# Patient Record
Sex: FEMALE | Race: WHITE | Employment: UNEMPLOYED | URBAN - NONMETROPOLITAN AREA
[De-identification: names, ages, dates, MRNs, and addresses within clinical notes are randomized per-mention and may not be internally consistent; named-entity substitution may affect disease eponyms.]

---

## 2018-05-16 ENCOUNTER — OFFICE VISIT (OUTPATIENT)
Dept: ORTHOPEDIC SURGERY | Age: 73
End: 2018-05-16

## 2018-05-16 VITALS — BODY MASS INDEX: 43.36 KG/M2 | WEIGHT: 244.71 LBS | HEIGHT: 63 IN

## 2018-05-16 DIAGNOSIS — M25.511 ACUTE PAIN OF RIGHT SHOULDER: Primary | ICD-10-CM

## 2018-05-16 PROCEDURE — 1036F TOBACCO NON-USER: CPT | Performed by: ORTHOPAEDIC SURGERY

## 2018-05-16 PROCEDURE — G8400 PT W/DXA NO RESULTS DOC: HCPCS | Performed by: ORTHOPAEDIC SURGERY

## 2018-05-16 PROCEDURE — 3017F COLORECTAL CA SCREEN DOC REV: CPT | Performed by: ORTHOPAEDIC SURGERY

## 2018-05-16 PROCEDURE — 1090F PRES/ABSN URINE INCON ASSESS: CPT | Performed by: ORTHOPAEDIC SURGERY

## 2018-05-16 PROCEDURE — 99214 OFFICE O/P EST MOD 30 MIN: CPT | Performed by: ORTHOPAEDIC SURGERY

## 2018-05-16 PROCEDURE — 1123F ACP DISCUSS/DSCN MKR DOCD: CPT | Performed by: ORTHOPAEDIC SURGERY

## 2018-05-16 PROCEDURE — 20610 DRAIN/INJ JOINT/BURSA W/O US: CPT | Performed by: ORTHOPAEDIC SURGERY

## 2018-05-16 PROCEDURE — G8427 DOCREV CUR MEDS BY ELIG CLIN: HCPCS | Performed by: ORTHOPAEDIC SURGERY

## 2018-05-16 PROCEDURE — 4040F PNEUMOC VAC/ADMIN/RCVD: CPT | Performed by: ORTHOPAEDIC SURGERY

## 2018-05-16 PROCEDURE — G8417 CALC BMI ABV UP PARAM F/U: HCPCS | Performed by: ORTHOPAEDIC SURGERY

## 2018-05-16 RX ORDER — MELOXICAM 15 MG/1
15 TABLET ORAL DAILY
Qty: 30 TABLET | Refills: 3 | Status: SHIPPED | OUTPATIENT
Start: 2018-05-16

## 2018-05-18 ENCOUNTER — HOSPITAL ENCOUNTER (OUTPATIENT)
Dept: PHYSICAL THERAPY | Age: 73
Discharge: HOME | End: 2018-05-31
Admitting: ORTHOPAEDIC SURGERY

## 2018-05-21 ENCOUNTER — HOSPITAL ENCOUNTER (OUTPATIENT)
Dept: PHYSICAL THERAPY | Age: 73
Discharge: HOME | End: 2018-05-22
Admitting: ORTHOPAEDIC SURGERY

## 2018-05-25 ENCOUNTER — HOSPITAL ENCOUNTER (OUTPATIENT)
Dept: PHYSICAL THERAPY | Age: 73
Discharge: HOME | End: 2018-05-26
Admitting: ORTHOPAEDIC SURGERY

## 2018-05-30 ENCOUNTER — HOSPITAL ENCOUNTER (OUTPATIENT)
Dept: PHYSICAL THERAPY | Age: 73
Discharge: HOME | End: 2018-06-30
Admitting: ORTHOPAEDIC SURGERY

## 2018-06-01 ENCOUNTER — HOSPITAL ENCOUNTER (OUTPATIENT)
Dept: PHYSICAL THERAPY | Age: 73
Discharge: HOME | End: 2018-06-02
Admitting: ORTHOPAEDIC SURGERY

## 2018-06-01 ENCOUNTER — HOSPITAL ENCOUNTER (OUTPATIENT)
Dept: OTHER | Age: 73
Discharge: HOME | End: 2018-06-01
Attending: ORTHOPAEDIC SURGERY | Admitting: ORTHOPAEDIC SURGERY

## 2018-06-06 ENCOUNTER — HOSPITAL ENCOUNTER (OUTPATIENT)
Dept: PHYSICAL THERAPY | Age: 73
Discharge: HOME | End: 2018-06-07
Admitting: ORTHOPAEDIC SURGERY

## 2018-06-13 ENCOUNTER — OFFICE VISIT (OUTPATIENT)
Dept: ORTHOPEDIC SURGERY | Age: 73
End: 2018-06-13

## 2018-06-13 ENCOUNTER — HOSPITAL ENCOUNTER (OUTPATIENT)
Dept: PHYSICAL THERAPY | Age: 73
Discharge: HOME | End: 2018-06-14
Admitting: ORTHOPAEDIC SURGERY

## 2018-06-13 VITALS — HEIGHT: 63 IN | WEIGHT: 244 LBS | BODY MASS INDEX: 43.23 KG/M2

## 2018-06-13 DIAGNOSIS — M25.511 ACUTE PAIN OF RIGHT SHOULDER: Primary | ICD-10-CM

## 2018-06-13 PROCEDURE — 4040F PNEUMOC VAC/ADMIN/RCVD: CPT | Performed by: ORTHOPAEDIC SURGERY

## 2018-06-13 PROCEDURE — 1036F TOBACCO NON-USER: CPT | Performed by: ORTHOPAEDIC SURGERY

## 2018-06-13 PROCEDURE — G8427 DOCREV CUR MEDS BY ELIG CLIN: HCPCS | Performed by: ORTHOPAEDIC SURGERY

## 2018-06-13 PROCEDURE — 1123F ACP DISCUSS/DSCN MKR DOCD: CPT | Performed by: ORTHOPAEDIC SURGERY

## 2018-06-13 PROCEDURE — G8400 PT W/DXA NO RESULTS DOC: HCPCS | Performed by: ORTHOPAEDIC SURGERY

## 2018-06-13 PROCEDURE — 3017F COLORECTAL CA SCREEN DOC REV: CPT | Performed by: ORTHOPAEDIC SURGERY

## 2018-06-13 PROCEDURE — G8417 CALC BMI ABV UP PARAM F/U: HCPCS | Performed by: ORTHOPAEDIC SURGERY

## 2018-06-13 PROCEDURE — 99214 OFFICE O/P EST MOD 30 MIN: CPT | Performed by: ORTHOPAEDIC SURGERY

## 2018-06-13 PROCEDURE — 1090F PRES/ABSN URINE INCON ASSESS: CPT | Performed by: ORTHOPAEDIC SURGERY

## 2018-06-18 ENCOUNTER — HOSPITAL ENCOUNTER (OUTPATIENT)
Dept: PHYSICAL THERAPY | Age: 73
Discharge: HOME | End: 2018-06-19
Admitting: ORTHOPAEDIC SURGERY

## 2018-06-20 ENCOUNTER — HOSPITAL ENCOUNTER (OUTPATIENT)
Dept: PHYSICAL THERAPY | Age: 73
Discharge: HOME | End: 2018-06-21
Admitting: ORTHOPAEDIC SURGERY

## 2018-06-27 ENCOUNTER — HOSPITAL ENCOUNTER (OUTPATIENT)
Dept: PHYSICAL THERAPY | Age: 73
Discharge: HOME | End: 2018-06-28
Admitting: ORTHOPAEDIC SURGERY

## 2018-07-01 ENCOUNTER — HOSPITAL ENCOUNTER (OUTPATIENT)
Dept: OTHER | Age: 73
Discharge: HOME | End: 2018-07-31
Attending: ORTHOPAEDIC SURGERY | Admitting: ORTHOPAEDIC SURGERY

## 2018-07-06 ENCOUNTER — HOSPITAL ENCOUNTER (OUTPATIENT)
Dept: PHYSICAL THERAPY | Age: 73
Discharge: HOME | End: 2018-07-07
Admitting: ORTHOPAEDIC SURGERY

## 2018-07-06 NOTE — FLOWSHEET NOTE
[address] [place], [place] [address]In[facility] Hospitalchristopher Mead  Phone: [phone] Fax: [phone]      Physical Therapy Daily Treatment Note  Date:      Patient Name:  Dimitri Cruz    :  1945  MRN: [id number]  Restrictions/Precautions:    Medical/Treatment Diagnosis Information:  · Diagnosis: Acute Pain of Right Shoulder M25.511  · Treatment Diagnosis: Right Shoulder Pain S66.666  Insurance/Certification information:  PT Insurance Information: [facility] [employer] Medicare  Physician Information:  Referring Practitioner: Sy Boston of care signed (Y/N):     Date of Patient follow up with Physician:     G-Code (if applicable):      Date G-Code Applied:    PT G-Codes  Functional Assessment Tool Used: Colbert Michael  Score: 27%  Functional Limitation: Carrying, moving and handling objects  Carrying, Moving and Handling Objects Current Status (): At least 20 percent but less than 40 percent impaired, limited or restricted  Carrying, Moving and Handling Objects Goal Status (): At least 1 percent but less than 20 percent impaired, limited or restricted    Progress Note: [x]  Yes  []  No  Next due by: Visit #10      Latex Allergy:  [x]NO      []YES  Preferred Language for Healthcare:   [x]English       []other:    Visit # Insurance Allowable   11 Medicare guidelines     Pain level:       SUBJECTIVE:  Patient states no shoulder pain for the past 4 days. 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 OBJECTIVE:   Observation:   Test measurements:      RESTRICTIONS/PRECAUTIONS: history of B RC repairs.     Exercises/Interventions:   Therapeutic Ex Sets/sec Reps Notes   UBE      Cane AAROM flex/press      3 way Isomet      T- band Row/pinch 2 15 rtb   T- band lower pinch 2 15 rtb   T- band ER activation With TC and Gtb    Supine SA punch 1 20 4#     SL ER/flexion/abd Prone Rows/HAB/ext 1# Rows/ext   Seat Table slides/Wall Slides Seated HH  Depression      No Money 2 15 hooklying with scap retraction   flex/scap 1# weights/ shoulders against the wall   Standing Punch      Lawnmower      pec minor door stretch R UE                     Manual Intervention      Shld /GH Mobs 16 min     Post Cap mobs      Thoracic/Rib manipualtion   Grade 2-3 mobs   CT MT/Mobs      PROM MT 5 min     Elbow mobs      STM/IASTM 8 in   Biceps/deltoid/Upper trap/pec minor   NMR re-education      T-spine Ext      Kinesotaping Scap/GH NMR      Body blade      Wall ball roll      Wall Ball bounce      Ball drops      Haylie Scap Bio          Therapeutic Exercise and NMR EXR  [x] (34685) Provided verbal/tactile cueing for activities related to strengthening, flexibility, endurance, ROM  for improvements in scapular, scapulothoracic and UE control with self care, reaching, carrying, lifting, house/yardwork, driving/computer work. [x] (98718) Provided verbal/tactile cueing for activities related to improving balance, coordination, kinesthetic sense, posture, motor skill, proprioception  to assist with  scapular, scapulothoracic and UE control with self care, reaching, carrying, lifting, house/yardwork, driving/computer work. Therapeutic Activities:    [] (72290 or 95479) Provided verbal/tactile cueing for activities related to improving balance, coordination, kinesthetic sense, posture, motor skill, proprioception and motor activation to allow for proper function of scapular, scapulothoracic and UE control with self care, carrying, lifting, driving/computer work.      Home Exercise Program:    [x] (86292) Reviewed/Progressed HEP activities related to strengthening, flexibility, endurance, ROM of scapular, scapulothoracic and UE control with self care, reaching, carrying, lifting, house/yardwork, driving/computer work  [] (39182) Reviewed/Progressed HEP activities related to improving balance, coordination, kinesthetic sense, posture, motor skill, proprioception of indicated by patients Functional Deficits. 4. Patient will return to prior functional activities without increased symptoms or restriction. 5. Patient will report no pain or limitations with hobbies and yard work. Progression Towards Functional goals:  [x] Patient is progressing as expected towards functional goals listed. [] Progression is slowed due to complexities listed. [] Progression has been slowed due to co-morbidities. [] Plan just implemented, too soon to assess goals progression  [] Other:     ASSESSMENT:  Patient tolerated treatment well today. Patient remained pain free throughout session and improved postural awareness noted at this time. F/U in 1 week followed by decreased frequency.        Return to Play: (if applicable)     []  Stage 1: Intro to Strength   []  Stage 2: Dynamic Strength and Intro to Plyometrics   []  Stage 3: Advanced Plyometrics and Intro to Throwing   []  Stage 4: Sport specific Training/Return to Sport     []  Ready to Return to Play, [employer]   []  Not Ready for Return to Sports   Comments:      Treatment/Activity Tolerance:  [x] Patient tolerated treatment well [] Patient limited by fatique  [] Patient limited by pain  [] Patient limited by other medical complications  [] Other:     Prognosis: [x] Good [] Fair  [] Poor    Patient Requires Follow-up: [x] Yes  [] No    PLAN:   [x] Continue per plan of care [] Alter current plan (see comments)  [] Plan of care initiated [] Hold pending MD visit [] Discharge    Electronically signed by: Dante Turcios PT

## 2018-07-16 ENCOUNTER — HOSPITAL ENCOUNTER (OUTPATIENT)
Dept: PHYSICAL THERAPY | Age: 73
Discharge: HOME | End: 2018-07-17
Admitting: ORTHOPAEDIC SURGERY

## 2018-07-16 NOTE — FLOWSHEET NOTE
[address] [place] [address]  Phone: [phone] Fax: [phone]      Physical Therapy Daily Treatment Note  Date:  2018    Patient Name:  Una Brown    :  1945  MRN: [id number]  Restrictions/Precautions:    Medical/Treatment Diagnosis Information:  · Diagnosis: Acute Pain of Right Shoulder M25.511  · Treatment Diagnosis: Right Shoulder Pain T69.755  Insurance/Certification information:  PT Insurance Information: [employer]  Physician Information:  Referring Practitioner: Kraig Núñez of care signed (Y/N):     Date of Patient follow up with Physician:     G-Code (if applicable):      Date G-Code Applied:    PT G-Codes  Functional Assessment Tool Used: Ethlyn Kayser  Score: 27%  Functional Limitation: Carrying, moving and handling objects  Carrying, Moving and Handling Objects Current Status (): At least 20 percent but less than 40 percent impaired, limited or restricted  Carrying, Moving and Handling Objects Goal Status (): At least 1 percent but less than 20 percent impaired, limited or restricted    Progress Note: [x]  Yes  []  No  Next due by: Visit #10      Latex Allergy:  [x]NO      []YES  Preferred Language for Healthcare:   [x]English       []other:    Visit # Insurance Allowable   13 Medicare guidelines     Pain level:       SUBJECTIVE:  Patient states the body is stiff all over. Patient reports the shoulder continues to be sore and the other is starting to hurt in some positions. OBJECTIVE:   Observation:   Test measurements:      RESTRICTIONS/PRECAUTIONS: history of B RC repairs.     Exercises/Interventions:   Therapeutic Ex Sets/sec Reps Notes   UBE      Cane AAROM flex/press      3 way Isomet      T- band Row/pinch 2 15 rtb   T- band lower pinch 2 15 rtb   T- band ER activation With TC and Gtb    Supine SA punch 1 20 4#     SL ER/flexion/abd 1 25ea #2 Prone Rows/HAB/ext 1# Rows/ext   Seat Table slides/Wall Slides      Seated HH  Depression      No Money 2 15 hooklying with scap retraction   flex/scap 1# weights/ shoulders against the wall   Standing Punch      Lawnmower      pec minor door stretch 4 20\" R UE                     Manual Intervention      Shld /GH Mobs 14 min     Post Cap mobs      Thoracic/Rib manipualtion      CT MT/Mobs      PROM MT 5 min     Elbow mobs      STM/IASTM 8 in   Biceps/deltoid/Upper trap/pec minor   NMR re-education      T-spine Ext      Kinesotaping Scap/GH NMR      Body blade      Wall ball roll      Wall Ball bounce      Ball drops      Haylie Scap Bio          Therapeutic Exercise and NMR EXR  [x] (55581) Provided verbal/tactile cueing for activities related to strengthening, flexibility, endurance, ROM  for improvements in scapular, scapulothoracic and UE control with self care, reaching, carrying, lifting, house/yardwork, driving/computer work. [x] (12996) Provided verbal/tactile cueing for activities related to improving balance, coordination, kinesthetic sense, posture, motor skill, proprioception  to assist with  scapular, scapulothoracic and UE control with self care, reaching, carrying, lifting, house/yardwork, driving/computer work. Therapeutic Activities:    [] (08809 or 83060) Provided verbal/tactile cueing for activities related to improving balance, coordination, kinesthetic sense, posture, motor skill, proprioception and motor activation to allow for proper function of scapular, scapulothoracic and UE control with self care, carrying, lifting, driving/computer work.      Home Exercise Program:    [x] (38290) Reviewed/Progressed HEP activities related to strengthening, flexibility, endurance, ROM of scapular, scapulothoracic and UE control with self care, reaching, carrying, lifting, house/yardwork, driving/computer work  [] (07887) Reviewed/Progressed HEP activities related to improving balance, coordination, kinesthetic sense, posture, motor skill, proprioception of scapular, scapulothoracic and UE control with self care, reaching, carrying, lifting, house/yardwork, driving/computer work      Manual Treatments:  PROM / STM / Oscillations-Mobs:  G-I, II, III, IV (Angeles, Inf., Post.)  [x] (53330) Provided manual therapy to mobilize soft tissue/joints of cervical/CT, scapular GHJ and UE for the purpose of modulating pain, promoting relaxation,  increasing ROM, reducing/eliminating soft tissue swelling/inflammation/restriction, improving soft tissue extensibility and allowing for proper ROM for normal function with self care, reaching, carrying, lifting, house/yardwork, driving/computer work    Modalities:   Charges:  Timed Code Treatment Minutes: 45   Total Treatment Minutes: 50     [] EVAL (LOW) 58558 (typically 20 minutes face-to-face)  [] EVAL (MOD) 41020 (typically 30 minutes face-to-face)  [] EVAL (HIGH) 84099 (typically 45 minutes face-to-face)  [] RE-EVAL     [x] HC(94775) x  1   [] IONTO  [] NMR (62813) x      [] VASO  [x] Manual (38269) x  2    [] Other:  [] TA x       [] Mech Traction (82325)  [] ES(attended) (30408)      [] ES (un) (73389):     GOALS:  Patient stated goal: To perform Adls, hobbies and yard work pain free. Therapist goals for Patient:   Short Term Goals: To be achieved in: 2 weeks  1. Independent in HEP and progression per patient tolerance, in order to prevent re-injury. 2. Patient will have a decrease in pain to facilitate improvement in movement, function, and ADLs as indicated by Functional Deficits. Long Term Goals: To be achieved in: 6weeks  1. Disability index score of 17% or less for the DASH to assist with reaching prior level of function. 2. Patient will demonstrate increased AROM to WNL to allow for proper joint functioning as indicated by patients Functional Deficits.    3. Patient will demonstrate an increase in Strength to good scapular and core control, 5/5 gross motor strength of the UE to allow for proper functional mobility as indicated by patients Functional Deficits. 4. Patient will return to prior functional activities without increased symptoms or restriction. 5. Patient will report no pain or limitations with hobbies and yard work. Progression Towards Functional goals:  [x] Patient is progressing as expected towards functional goals listed. [] Progression is slowed due to complexities listed. [] Progression has been slowed due to co-morbidities. [] Plan just implemented, too soon to assess goals progression  [] Other:     ASSESSMENT:  Patient tolerated treatment well today. Continued soreness in the shoulder and verbal and tactile cuing required for form and decreased compensations with ER. Increased pain this date with ER, decreased pain noted when cued to perform scap retractions with AROM.      Return to Play: (if applicable)     []  Stage 1: Intro to Strength   []  Stage 2: Dynamic Strength and Intro to Plyometrics   []  Stage 3: Advanced Plyometrics and Intro to Throwing   []  Stage 4: Sport specific Training/Return to Sport     []  Ready to Return to Play, [employer]   []  Not Ready for Return to Sports   Comments:      Treatment/Activity Tolerance:  [x] Patient tolerated treatment well [] Patient limited by fatique  [] Patient limited by pain  [] Patient limited by other medical complications  [] Other:     Prognosis: [x] Good [] Fair  [] Poor    Patient Requires Follow-up: [x] Yes  [] No    PLAN:   [x] Continue per plan of care [] Alter current plan (see comments)  [] Plan of care initiated [] Hold pending MD visit [] Discharge    Electronically signed by: Breanna Michelle PT

## 2018-07-25 ENCOUNTER — OFFICE VISIT (OUTPATIENT)
Dept: ORTHOPEDIC SURGERY | Age: 73
End: 2018-07-25

## 2018-07-25 ENCOUNTER — HOSPITAL ENCOUNTER (OUTPATIENT)
Dept: PHYSICAL THERAPY | Age: 73
Discharge: HOME | End: 2018-07-26
Admitting: ORTHOPAEDIC SURGERY

## 2018-07-25 VITALS — WEIGHT: 244 LBS | HEIGHT: 63 IN | BODY MASS INDEX: 43.23 KG/M2

## 2018-07-25 DIAGNOSIS — M25.511 ACUTE PAIN OF RIGHT SHOULDER: Primary | ICD-10-CM

## 2018-07-25 PROCEDURE — 1101F PT FALLS ASSESS-DOCD LE1/YR: CPT | Performed by: ORTHOPAEDIC SURGERY

## 2018-07-25 PROCEDURE — 4040F PNEUMOC VAC/ADMIN/RCVD: CPT | Performed by: ORTHOPAEDIC SURGERY

## 2018-07-25 PROCEDURE — 3017F COLORECTAL CA SCREEN DOC REV: CPT | Performed by: ORTHOPAEDIC SURGERY

## 2018-07-25 PROCEDURE — G8400 PT W/DXA NO RESULTS DOC: HCPCS | Performed by: ORTHOPAEDIC SURGERY

## 2018-07-25 PROCEDURE — 99214 OFFICE O/P EST MOD 30 MIN: CPT | Performed by: ORTHOPAEDIC SURGERY

## 2018-07-25 PROCEDURE — 1090F PRES/ABSN URINE INCON ASSESS: CPT | Performed by: ORTHOPAEDIC SURGERY

## 2018-07-25 PROCEDURE — G8427 DOCREV CUR MEDS BY ELIG CLIN: HCPCS | Performed by: ORTHOPAEDIC SURGERY

## 2018-07-25 PROCEDURE — G8417 CALC BMI ABV UP PARAM F/U: HCPCS | Performed by: ORTHOPAEDIC SURGERY

## 2018-07-25 PROCEDURE — 1036F TOBACCO NON-USER: CPT | Performed by: ORTHOPAEDIC SURGERY

## 2018-07-25 PROCEDURE — 1123F ACP DISCUSS/DSCN MKR DOCD: CPT | Performed by: ORTHOPAEDIC SURGERY

## 2018-07-25 NOTE — PROGRESS NOTES
shrug strength is 5 over 5 equal bilaterally. Radial ulnar and median nerve function is intact. Capillary refill is brisk. Elbow motion finger and wrist motion is full equal bilaterally. Deep tendon reflexes of the Brachial radialis, biceps, tricepsAre all +2/4 equal bilaterally. Cervical spine range of motion is full without pain negative Spurling's test.  Load-and-shift test is negative. Crank test is negative. Apprehension and relocation is negative. Anterior and posterior glide are equal bilaterally. Negative sulcus sign. No signs of any significant multidirectional instability. There is no scapular winging. There is no muscle atrophy of the latissimus dorsi, the deltoid, the periscapular musculature,The trapezius musculature or the pectoralis musculature. Negative Neer's test, negative Lewis test, no pain with crossarm elevation. Abduction --150 degrees  Flexion-- 180 degrees  Extension-- between 45-60 degrees  Latera/external  rotation --close to 90 degrees  Medial/ internal rotation -- between 70-90 degree      Reflex: upper extremity: Deep tendon reflexes of the biceps, triceps, brachioradialis +2/4 equal bilaterally  Lower extremity: +2/4 and equal bilaterally for patella and Achilles    Additional Comments:       Cervical spine exam demonstrates no  Radiculopathy no reproduction of the symptomology. Range of motion is normal without pain or radiculopathy and does not cause shoulder pain. Additional Examinations:  Left Upper Extremity: Examination of the left upper extremity does not show any tenderness, deformity or injury. Range of motion is unremarkable. There is no gross instability. There are no rashes, ulcerations or lesions. Strength and tone are normal.  Thoracic Spine: Examination of the thoracic spine does not show any tenderness, deformity or injury. Range of motion is unremarkable. There is no gross instability. There are no rashes, ulcerations or lesions.   Strength and tone are normal.  Neck: Examination of the neck does not show any tenderness, deformity or injury. Range of motion is unremarkable. There is no gross instability. There are no rashes, ulcerations or lesions. Strength and tone are normal.  Lower Back: Examination of the lower back does not show any tenderness, deformity or injury. Range of motion is unremarkable. There is no gross instability. There are no rashes, ulcerations or lesions. Strength and tone are normal.    Past Surgical History:   Procedure Laterality Date    KNEE ARTHROSCOPY      right    SHOULDER SURGERY      right    SHOULDER SURGERY  10-2-2012    left shoulder arthroscopy with subacromial decompression distal clavicle excision, rotator Cnss repair, labral debridement           Assessment:  Right shoulder acute injury completely resolved with physical therapy    Impression: Same    Office Procedures:  No orders of the defined types were placed in this encounter. Previous Treatments:  X-ray, physical therapy,    Treatment Plan:  Continue home exercise program follow-up as needed she is due to see Dr. Sonido Pinzon for future total knee arthroplasties      Lea Shaw. CORI Renee [address] [place] [address] and Sports Medicine  Sports Fellowship Trained  Board Certified  [employer] Team Physician      Disclaimer: \"This note was dictated with voice recognition software. Though review and correction are routine, we apologize for any errors. \"

## 2018-07-25 NOTE — FLOWSHEET NOTE
proprioception of scapular, scapulothoracic and UE control with self care, reaching, carrying, lifting, house/yardwork, driving/computer work      Manual Treatments:  PROM / STM / Oscillations-Mobs:  G-I, II, III, IV (Angeles, Inf., Post.)  [x] (55098) Provided manual therapy to mobilize soft tissue/joints of cervical/CT, scapular GHJ and UE for the purpose of modulating pain, promoting relaxation,  increasing ROM, reducing/eliminating soft tissue swelling/inflammation/restriction, improving soft tissue extensibility and allowing for proper ROM for normal function with self care, reaching, carrying, lifting, house/yardwork, driving/computer work    Modalities:   Charges:  Timed Code Treatment Minutes: 45   Total Treatment Minutes: 50     [] EVAL (LOW) 92039 (typically 20 minutes face-to-face)  [] EVAL (MOD) 96950 (typically 30 minutes face-to-face)  [] EVAL (HIGH) 26221 (typically 45 minutes face-to-face)  [] RE-EVAL     [x] AU(62691) x  2   [] IONTO  [] NMR (54183) x      [] VASO  [x] Manual (90146) x  1    [] Other:  [] TA x       [] Mech Traction (36859)  [] ES(attended) (89076)      [] ES (un) (36083):     GOALS:  Patient stated goal: To perform Adls, hobbies and yard work pain free. Therapist goals for Patient:   Short Term Goals: To be achieved in: 2 weeks  1. Independent in HEP and progression per patient tolerance, in order to prevent re-injury. 2. Patient will have a decrease in pain to facilitate improvement in movement, function, and ADLs as indicated by Functional Deficits. Long Term Goals: To be achieved in: 6weeks  1. Disability index score of 17% or less for the DASH to assist with reaching prior level of function. 2. Patient will demonstrate increased AROM to WNL to allow for proper joint functioning as indicated by patients Functional Deficits.    3. Patient will demonstrate an increase in Strength to good scapular and core control, 5/5 gross motor strength of the UE to allow for proper

## 2018-08-01 ENCOUNTER — HOSPITAL ENCOUNTER (OUTPATIENT)
Dept: OTHER | Age: 73
Discharge: HOME | End: 2018-08-31
Attending: ORTHOPAEDIC SURGERY | Admitting: ORTHOPAEDIC SURGERY

## 2018-08-14 ENCOUNTER — OFFICE VISIT (OUTPATIENT)
Dept: ORTHOPEDIC SURGERY | Age: 73
End: 2018-08-14

## 2018-08-14 VITALS — BODY MASS INDEX: 43.23 KG/M2 | WEIGHT: 244 LBS | HEIGHT: 63 IN

## 2018-08-14 DIAGNOSIS — M25.561 RIGHT KNEE PAIN, UNSPECIFIED CHRONICITY: Primary | ICD-10-CM

## 2018-08-14 DIAGNOSIS — M25.562 LEFT KNEE PAIN, UNSPECIFIED CHRONICITY: ICD-10-CM

## 2018-08-14 DIAGNOSIS — M17.0 PRIMARY OSTEOARTHRITIS OF BOTH KNEES: ICD-10-CM

## 2018-08-14 PROCEDURE — 99213 OFFICE O/P EST LOW 20 MIN: CPT | Performed by: ORTHOPAEDIC SURGERY

## 2018-08-14 PROCEDURE — 1090F PRES/ABSN URINE INCON ASSESS: CPT | Performed by: ORTHOPAEDIC SURGERY

## 2018-08-14 PROCEDURE — G8417 CALC BMI ABV UP PARAM F/U: HCPCS | Performed by: ORTHOPAEDIC SURGERY

## 2018-08-14 PROCEDURE — G8427 DOCREV CUR MEDS BY ELIG CLIN: HCPCS | Performed by: ORTHOPAEDIC SURGERY

## 2018-08-14 PROCEDURE — 1101F PT FALLS ASSESS-DOCD LE1/YR: CPT | Performed by: ORTHOPAEDIC SURGERY

## 2018-08-14 PROCEDURE — 3017F COLORECTAL CA SCREEN DOC REV: CPT | Performed by: ORTHOPAEDIC SURGERY

## 2018-08-14 NOTE — PROGRESS NOTES
Chief Complaint    Knee Pain (bilateral knees)      History of Present Illness:  Brittni Rider is a 67 y.o. female. She is here for consultation for her bilateral knees at referral Dr. Nick Leo. She does have a known history of long-term bilateral knee osteoarthritis. The right one has been worse than the left. She does have prior history of arthroscopy of the right knee she thinks back in 2008 at which time she had some meniscus removed and a chondroplasty. She has had several series of Synvisc injections as well as cortisone injections into this right knee since then. Left knee is also painful. She is never had arthroscopy on the left knee. She has never had Visco supplementation into the left knee. She has had cortisone injections into the left knee and the most recent one was in April of this year. She also does use meloxicam on a regular basis to help control pain. Feels like her activity is limited especially for long distance walking because of her knee pain           Medical History:  Patient's medications, allergies, past medical, surgical, social and family histories were reviewed and updated as appropriate. Review of Systems:  Pertinent items are noted in HPI  Review of systems reviewed from Patient History Form dated on 8/14/18 and available in the patient's chart under the Media tab. Vital Signs:  Ht 5' 3\" (1.6 m)   Wt 244 lb (110.7 kg)   BMI 43.22 kg/m²     General Exam:   Constitutional: Patient is adequately groomed with no evidence of malnutrition  DTRs: Deep tendon reflexes are intact  Mental Status: The patient is oriented to time, place and person. The patient's mood and affect are appropriate. Knee Examination:    Inspection:  She does have an overall varus alignment of bilateral knees. Palpation:  There is tenderness palpation along the medial joint line of the bilateral knees.   Palpable crepitus over the patellofemoral joint range of motion    Range of Motion:  Right

## 2018-08-15 ENCOUNTER — HOSPITAL ENCOUNTER (OUTPATIENT)
Dept: PHYSICAL THERAPY | Age: 73
Discharge: HOME | End: 2018-08-16
Admitting: ORTHOPAEDIC SURGERY

## 2018-08-15 NOTE — PLAN OF CARE
Assessment Tool Used: LEFS  Score: 54%  Functional Limitation: Mobility: Walking and moving around  Mobility: Walking and Moving Around Current Status (): At least 40 percent but less than 60 percent impaired, limited or restricted  Mobility: Walking and Moving Around Goal Status (): At least 20 percent but less than 40 percent impaired, limited or restricted    Pain Scale: 2-4/10  Easing factors: off loading LE  Provocative factors: walking, squatting, standing for long periods of time. Type: []Constant   []Intermittent  []Radiating [x]Localized []other:     Numbness/Tingling: numbness off an on on the lateral aspect of Right thigh    Occupation/School: Retired    Living Status/Prior Level of Function: Independent with ADLs and IADLs, and hobbies    OBJECTIVE:     ROM LEFT RIGHT   HIP Flex 100 100   HIP Abd     HIP Ext     HIP IR 25 25   HIP ER 23 20   Knee ext (5) (9)   Knee Flex 120 120   Ankle PF     Ankle DF     Ankle In     Ankle Ev     Strength  LEFT RIGHT   HIP Flexors 4/5 4/5   HIP Abductors 4-/5 4-/5   HIP Ext 4-/5 4-/5   Hip ER     Knee EXT (quad) 4/5 4/5   Knee Flex (HS) 4+/5 4+/5   Ankle DF     Ankle PF     Ankle Inv     Ankle EV          Circumference  Mid apex  7 cm prox             Reflexes/Sensation:    [x]Dermatomes/Myotomes intact    [x]Reflexes equal and normal bilaterally   []Other:    Joint mobility: Patella all directions (bilaterally), tib/fib, tib femoral bilaterally    []Normal    [x]Hypo   []Hyper    Palpation: Tight ITB, quad active trigger points     Functional Mobility/Transfers:  Min limitation     Posture: B genu varum, hip flexed posture    Bandages/Dressings/Incisions: NA    Gait: (include devices/WB status) Diminished heel strike, terminal knee extension and full hip extension     Orthopedic Special Tests: none                       [x] Patient history, allergies, meds reviewed. Medical chart reviewed. See intake form.      Review Of Systems (ROS):  [x]Performed Review of systems (Integumentary, CardioPulmonary, Neurological) by intake and observation. Intake form has been scanned into medical record. Patient has been instructed to contact their primary care physician regarding ROS issues if not already being addressed at this time. Co-morbidities/Complexities (which will affect course of rehabilitation):   []None           Arthritic conditions   []Rheumatoid arthritis (M05.9)  [x]Osteoarthritis (M19.91)   Cardiovascular conditions   []Hypertension (I10)  []Hyperlipidemia (E78.5)  []Angina pectoris (I20)  []Atherosclerosis (I70)  []CVA Musculoskeletal conditions   []Disc pathology   []Congenital spine pathologies   [x]Prior surgical intervention  []Osteoporosis (M81.8)  []Osteopenia (M85.8)   Endocrine conditions   []Hypothyroid (E03.9)  []Hyperthyroid Gastrointestinal conditions   []Constipation (U58.15)   Metabolic conditions   []Morbid obesity (E66.01)  [x]Diabetes type 1(E10.65) or 2 (E11.65)   []Neuropathy (G60.9)     Pulmonary conditions   []Asthma (J45)  []Coughing   []COPD (J44.9)   Psychological Disorders  []Anxiety (F41.9)  []Depression (F32.9)   []Other:   []Other:          Barriers to/and or personal factors that will affect rehab potential:              []Age  []Sex    []Smoker              []Motivation/Lack of Motivation                        []Co-Morbidities              []Cognitive Function, education/learning barriers              []Environmental, home barriers              []profession/work barriers  []past PT/medical experience  []other:  Justification:     Falls Risk Assessment (30 days):   [x] Falls Risk assessed and no intervention required.   [] Falls Risk assessed and Patient requires intervention due to being higher risk   TUG score (>12s at risk):     [] Falls education provided, including       G-Codes:  PT G-Codes  Functional Assessment Tool Used: LEFS  Score: 54%  Functional Limitation: Mobility: Walking and moving around  Mobility: Walking and activities. []Reduced participation in sport activities. Classification :    []Signs/symptoms consistent with post-surgical status including decreased ROM, strength and function. []Signs/symptoms consistent with joint sprain/strain   []Signs/symptoms consistent with patella-femoral syndrome   [x]Signs/symptoms consistent with knee OA/hip OA   []Signs/symptoms consistent with internal derangement of knee/Hip   []Signs/symptoms consistent with functional hip weakness/NMR control      []Signs/symptoms consistent with tendinitis/tendinosis    []signs/symptoms consistent with pathology which may benefit from Dry needling      []other:      Prognosis/Rehab Potential:      [x]Excellent   []Good    []Fair   []Poor    Tolerance of evaluation/treatment:    [x]Excellent   []Good    []Fair   []Poor    Physical Therapy Evaluation Complexity Justification  [x] A history of present problem with:  [] no personal factors and/or comorbidities that impact the plan of care;  [x]1-2 personal factors and/or comorbidities that impact the plan of care  []3 personal factors and/or comorbidities that impact the plan of care  [x] An examination of body systems using standardized tests and measures addressing any of the following: body structures and functions (impairments), activity limitations, and/or participation restrictions;:  [x] a total of 1-2 or more elements   [] a total of 3 or more elements   [] a total of 4 or more elements   [x] A clinical presentation with:  [x] stable and/or uncomplicated characteristics   [] evolving clinical presentation with changing characteristics  [] unstable and unpredictable characteristics;   [x] Clinical decision making of [x] low, [] moderate, [] high complexity using standardized patient assessment instrument and/or measurable assessment of functional outcome.     [x] EVAL (LOW) 64796 (typically 20 minutes face-to-face)  [] EVAL (MOD) 58398 (typically 30 minutes face-to-face)  [] EVAL (HIGH) 43017 (typically 45 minutes face-to-face)  [] RE-EVAL     PLAN:  Frequency/Duration:  2 days per week for 4 Weeks:  Interventions:  [x]  Therapeutic exercise including: strength training, ROM, for Lower extremity and core   [x]  NMR activation and proprioception for LE, Glutes and Core   [x]  Manual therapy as indicated for LE, Hip and spine to include: Dry Needling/IASTM, STM, PROM, Gr I-IV mobilizations, manipulation. [x] Modalities as needed that may include: thermal agents, E-stim, Biofeedback, US, iontophoresis as indicated  [x] Patient education on joint protection, postural re-education, activity modification, progression of HEP. HEP instruction: (see scanned forms)    GOALS:  Patient stated goal: To strengthen legs and decrease pain. Therapist goals for Patient:   Short Term Goals: To be achieved in: 2 weeks  1. Independent in HEP and progression per patient tolerance, in order to prevent re-injury. 2. Patient will have a decrease in pain to facilitate improvement in movement, function, and ADLs as indicated by Functional Deficits. Long Term Goals: To be achieved in: 4 weeks  1. Disability index score of 30% or less for the LEFS to assist with reaching prior level of function. 2. Patient will demonstrate increased AROM to WNL to allow for proper joint functioning as indicated by patients Functional Deficits. 3. Patient will demonstrate an increase in Strength to good proximal hip strength and control, within 5lb HHD in LE to allow for proper functional mobility as indicated by patients Functional Deficits. 4. Patient will return to prior functional activities without increased symptoms or restriction. 5. Patient will report walking for 30+ minutes without pain or feelings of weakness in B knees.     Electronically signed by:  Eva Quevedo PT

## 2018-08-15 NOTE — FLOWSHEET NOTE
[address] [place] [address]  Phone: [phone] Fax: [phone]    Physical Therapy Daily Treatment Note  Date:  8/15/2018    Patient Name:  Devota Moritz    :  1945  MRN: [id number]  Restrictions/Precautions:    Medical/Treatment Diagnosis Information:  Diagnosis: Primary OA of both knees M17.0  Treatment Diagnosis: Bilateral knee pain/weakness  Insurance/Certification information:  PT Insurance Information: Medicare/ [employer]  Physician Information:  Referring Practitioner: Ashley Judd  Plan of care signed (Y/N):     Date of Patient follow up with Physician:     G-Code (if applicable):      Date G-Code Applied:    PT G-Codes  Functional Assessment Tool Used: LEFS  Score: 54%  Functional Limitation: Mobility: Walking and moving around  Mobility: Walking and Moving Around Current Status (): At least 40 percent but less than 60 percent impaired, limited or restricted  Mobility: Walking and Moving Around Goal Status ():  At least 20 percent but less than 40 percent impaired, limited or restricted    Progress Note: [x]  Yes  []  No  Next due by: Visit #10       Latex Allergy:  [x]NO      []YES  Preferred Language for Healthcare:   [x]English       []other:    Visit # Insurance Allowable   1 medicare     Pain level:  2/10     SUBJECTIVE:  See eval    OBJECTIVE: See eval  Observation:   Test measurements:      RESTRICTIONS/PRECAUTIONS:     Exercises/Interventions:     Therapeutic Ex Sets/sec Reps Notes   Retro Stepper/BIKE      LAQ 1 10B    BFR      Sportcord March      3 way SLR 1 10B    SAQ 1 10B    Clam ABD      Hip Ext /table      BOSU fwd/side lunge      squat 1 12 At table/ UE  support   Leg Press Iso/Con/Ecc 0-      Cybex HS curl      TKE      Glute side walks      RDL      Slide Lunge      Slide HS eccentrics      Step ups/ecc step down      Swissball wall rolls- in SLS- hip drive      Quad hip ext/wall-ball hip and LE for self care, mobility, lifting, and ambulation/stair navigation      Manual Treatments:  PROM / STM / Oscillations-Mobs:  G-I, II, III, IV (PA's, Inf., Post.)  [x] (44779) Provided manual therapy to mobilize LE, proximal hip and/or LS spine soft tissue/joints for the purpose of modulating pain, promoting relaxation,  increasing ROM, reducing/eliminating soft tissue swelling/inflammation/restriction, improving soft tissue extensibility and allowing for proper ROM for normal function with self care, mobility, lifting and ambulation. Modalities:      Charges:  Timed Code Treatment Minutes: 35   Total Treatment Minutes: 60     [x] EVAL (LOW) 30875 (typically 20 minutes face-to-face)  [] EVAL (MOD) 44872 (typically 30 minutes face-to-face)  [] EVAL (HIGH) 31396 (typically 45 minutes face-to-face)  [] RE-EVAL     [x] RF(57249) x  1   [] IONTO  [] NMR (33587) x      [] VASO  [x] Manual (10073) x  1    [] Other:  [] TA x       [] Mech Traction (21533)  [] ES(attended) (76900)      [] ES (un) (80115):     GOALS:   Patient stated goal: To strengthen legs and decrease pain. Therapist goals for Patient:   Short Term Goals: To be achieved in: 2 weeks  1. Independent in HEP and progression per patient tolerance, in order to prevent re-injury. 2. Patient will have a decrease in pain to facilitate improvement in movement, function, and ADLs as indicated by Functional Deficits. Long Term Goals: To be achieved in: 4 weeks  1. Disability index score of 30% or less for the LEFS to assist with reaching prior level of function. 2. Patient will demonstrate increased AROM to WNL to allow for proper joint functioning as indicated by patients Functional Deficits. 3. Patient will demonstrate an increase in Strength to good proximal hip strength and control, within 5lb HHD in LE to allow for proper functional mobility as indicated by patients Functional Deficits.    4. Patient will return to prior functional activities without increased symptoms or restriction. 5. Patient will report walking for 30+ minutes without pain or feelings of weakness in B knees. Progression Towards Functional goals:  [] Patient is progressing as expected towards functional goals listed. [] Progression is slowed due to complexities listed. [] Progression has been slowed due to co-morbidities.   [x] Plan just implemented, too soon to assess goals progression  [] Other:     ASSESSMENT:  See eval    Return to Play: (if applicable)   []  Stage 1: Intro to Strength   []  Stage 2: Return to Run and Strength   []  Stage 3: Return to Jump and Strength   []  Stage 4: Dynamic Strength and Agility   []  Stage 5: Sport Specific Training     []  Ready to Return to Play, Meets All Above Stages   []  Not Ready for Return to Sports   Comments:                         Treatment/Activity Tolerance:  [x] Patient tolerated treatment well [] Patient limited by fatique  [] Patient limited by pain  [] Patient limited by other medical complications  [] Other:     Prognosis: [x] Good [] Fair  [] Poor    Patient Requires Follow-up: [x] Yes  [] No      PLAN: See belinda  [] Continue per plan of care [] Alter current plan (see comments)  [x] Plan of care initiated [] Hold pending MD visit [] Discharge    Electronically signed by: Breanna Michelle PT

## 2018-08-17 ENCOUNTER — HOSPITAL ENCOUNTER (OUTPATIENT)
Dept: PHYSICAL THERAPY | Age: 73
Discharge: HOME | End: 2018-08-18
Admitting: ORTHOPAEDIC SURGERY

## 2018-08-20 ENCOUNTER — HOSPITAL ENCOUNTER (OUTPATIENT)
Dept: PHYSICAL THERAPY | Age: 73
Discharge: HOME | End: 2018-08-21
Admitting: ORTHOPAEDIC SURGERY

## 2018-08-20 NOTE — FLOWSHEET NOTE
Slide HS eccentrics      Step ups/ecc step down 1 10B    Swissball wall rolls- in SLS- hip drive      Quad hip ext/wall-ball rolls                  Manual Intervention      Knee mobs/PROM 3 min     Tib/Fem Mobs      Patella Mobs 8 min     Ankle mobs      STM/IASTM 11 min  B quads         NMR re-education      Stateless/Biofeedback 10/10      BFR      G. Med activation/sidelying      G. Max Activation/prone      Hip Ext full ROM G. Activation      Bosu Bal and Prop- G Med      Single leg stance/Balance/Prop      Bosu Retro G. Med act      Prone Hip froggers- sliders/elevated                Therapeutic Exercise and NMR EXR  [x] (06848) Provided verbal/tactile cueing for activities related to strengthening, flexibility, endurance, ROM for improvements in LE, proximal hip, and core control with self care, mobility, lifting, ambulation. [x] (40728) Provided verbal/tactile cueing for activities related to improving balance, coordination, kinesthetic sense, posture, motor skill, proprioception  to assist with LE, proximal hip, and core control in self care, mobility, lifting, ambulation and eccentric single leg control.      NMR and Therapeutic Activities:    [] (51996 or 79774) Provided verbal/tactile cueing for activities related to improving balance, coordination, kinesthetic sense, posture, motor skill, proprioception and motor activation to allow for proper function of core, proximal hip and LE with self care and ADLs  [] (35071) Gait Re-education- Provided training and instruction to the patient for proper LE, core and proximal hip recruitment and positioning and eccentric body weight control with ambulation re-education including up and down stairs     Home Exercise Program:    [x] (71785) Reviewed/Progressed HEP activities related to strengthening, flexibility, endurance, ROM of core, proximal hip and LE for functional self-care, mobility, lifting and ambulation/stair navigation   [] (65007)Reviewed/Progressed HEP activities related to improving balance, coordination, kinesthetic sense, posture, motor skill, proprioception of core, proximal hip and LE for self care, mobility, lifting, and ambulation/stair navigation      Manual Treatments:  PROM / STM / Oscillations-Mobs:  G-I, II, III, IV (PA's, Inf., Post.)  [x] (01238) Provided manual therapy to mobilize LE, proximal hip and/or LS spine soft tissue/joints for the purpose of modulating pain, promoting relaxation,  increasing ROM, reducing/eliminating soft tissue swelling/inflammation/restriction, improving soft tissue extensibility and allowing for proper ROM for normal function with self care, mobility, lifting and ambulation. Modalities:      Charges:  Timed Code Treatment Minutes: 35   Total Treatment Minutes: 60     [] EVAL (LOW) 45744 (typically 20 minutes face-to-face)  [] EVAL (MOD) 16100 (typically 30 minutes face-to-face)  [] EVAL (HIGH) 57750 (typically 45 minutes face-to-face)  [] RE-EVAL     [x] KX(31591) x  1   [] IONTO  [] NMR (84778) x      [] VASO  [x] Manual (89317) x  1    [] Other:  [] TA x       [] Mech Traction (36820)  [] ES(attended) (38013)      [] ES (un) (53964):     GOALS:   Patient stated goal: To strengthen legs and decrease pain. Therapist goals for Patient:   Short Term Goals: To be achieved in: 2 weeks  1. Independent in HEP and progression per patient tolerance, in order to prevent re-injury. 2. Patient will have a decrease in pain to facilitate improvement in movement, function, and ADLs as indicated by Functional Deficits. Long Term Goals: To be achieved in: 4 weeks  1. Disability index score of 30% or less for the LEFS to assist with reaching prior level of function. 2. Patient will demonstrate increased AROM to WNL to allow for proper joint functioning as indicated by patients Functional Deficits.    3. Patient will demonstrate an increase in Strength to good proximal hip strength and control, within 5lb HHD in LE to allow for proper functional mobility as indicated by patients Functional Deficits. 4. Patient will return to prior functional activities without increased symptoms or restriction. 5. Patient will report walking for 30+ minutes without pain or feelings of weakness in B knees. Progression Towards Functional goals:  [x] Patient is progressing as expected towards functional goals listed. [] Progression is slowed due to complexities listed. [] Progression has been slowed due to co-morbidities. [] Plan just implemented, too soon to assess goals progression  [] Other:     ASSESSMENT:  Continued hypomobility noted throughout the throughout the patella in all directions. Patient tolerance for standing activities is showing improvements but remains limited.  Rest breaks encouraged due to patients fast onset of fatigue with SL isolated exercises I.e bosu lunge    Return to Play: (if applicable)   []  Stage 1: Intro to Strength   []  Stage 2: Return to Run and Strength   []  Stage 3: Return to Jump and Strength   []  Stage 4: Dynamic Strength and Agility   []  Stage 5: Sport Specific Training     []  Ready to Return to Play, Meets All Above Stages   []  Not Ready for Return to Sports   Comments:                         Treatment/Activity Tolerance:  [x] Patient tolerated treatment well [] Patient limited by fatique  [] Patient limited by pain  [] Patient limited by other medical complications  [] Other:     Prognosis: [x] Good [] Fair  [] Poor    Patient Requires Follow-up: [x] Yes  [] No      PLAN:  [x] Continue per plan of care [] Alter current plan (see comments)  [] Plan of care initiated [] Hold pending MD visit [] Discharge    Electronically signed by: Jimmie Brito PT

## 2018-08-20 NOTE — FLOWSHEET NOTE
[address] [place] [address]  Phone: [phone] Fax: [phone]    Physical Therapy Daily Treatment Note  Date:  2018    Patient Name:  Page Silverio    :  1945  MRN: [id number]  Restrictions/Precautions:    Medical/Treatment Diagnosis Information:  Diagnosis: Primary OA of both knees M17.0  Treatment Diagnosis: Bilateral knee pain/weakness  Insurance/Certification information:  PT Insurance Information: Medicare/ [employer]  Physician Information:  Referring Practitioner: Ronnie Salazar  Plan of care signed (Y/N):     Date of Patient follow up with Physician:     G-Code (if applicable):      Date G-Code Applied:    PT G-Codes  Functional Assessment Tool Used: LEFS  Score: 54%  Functional Limitation: Mobility: Walking and moving around  Mobility: Walking and Moving Around Current Status (): At least 40 percent but less than 60 percent impaired, limited or restricted  Mobility: Walking and Moving Around Goal Status (): At least 20 percent but less than 40 percent impaired, limited or restricted    Progress Note: []  Yes  [x]  No  Next due by: Visit #10       Latex Allergy:  [x]NO      []YES  Preferred Language for Healthcare:   [x]English       []other:    Visit # Insurance Allowable   2 medicare     Pain level:  2/10     SUBJECTIVE:  Patient reports increased soreness the knees from the evaluation and the HEP.      OBJECTIVE:   Observation:   Test measurements:      RESTRICTIONS/PRECAUTIONS:     Exercises/Interventions:     Therapeutic Ex Sets/sec Reps Notes   Retro Stepper/BIKE      LAQ 1 10B    BFR      Sportcord March      3 way SLR 1 10B    SAQ 1 10B    Clam ABD      Hip Ext /table 1 10    BOSU fwd/side lunge      squat 1 12 At table/ UE  support   Leg Press Iso/Con/Ecc 0-      Cybex HS curl      TKE 1 20B 30#   Glute side walks      Bridges 2 10    Slide Lunge 1 10    Slide HS eccentrics      Step ups/ecc step down      Swissball wall rolls- in SLS- hip drive      Quad hip ext/wall-ball rolls                  Manual Intervention      Knee mobs/PROM 6 min     Tib/Fem Mobs      Patella Mobs 8 min     Ankle mobs      STM/IASTM 11 min  B quads         NMR re-education      [place]/Biofeedback 10/10      BFR      G. Med activation/sidelying      G. Max Activation/prone      Hip Ext full ROM G. Activation      Bosu Bal and Prop- G Med      Single leg stance/Balance/Prop      Bosu Retro G. Med act      Prone Hip froggers- sliders/elevated                Therapeutic Exercise and NMR EXR  [x] (26642) Provided verbal/tactile cueing for activities related to strengthening, flexibility, endurance, ROM for improvements in LE, proximal hip, and core control with self care, mobility, lifting, ambulation. [x] (80308) Provided verbal/tactile cueing for activities related to improving balance, coordination, kinesthetic sense, posture, motor skill, proprioception  to assist with LE, proximal hip, and core control in self care, mobility, lifting, ambulation and eccentric single leg control.      NMR and Therapeutic Activities:    [] (50766 or 29197) Provided verbal/tactile cueing for activities related to improving balance, coordination, kinesthetic sense, posture, motor skill, proprioception and motor activation to allow for proper function of core, proximal hip and LE with self care and ADLs  [] (60209) Gait Re-education- Provided training and instruction to the patient for proper LE, core and proximal hip recruitment and positioning and eccentric body weight control with ambulation re-education including up and down stairs     Home Exercise Program:    [x] (52575) Reviewed/Progressed HEP activities related to strengthening, flexibility, endurance, ROM of core, proximal hip and LE for functional self-care, mobility, lifting and ambulation/stair navigation   [] (15082)Reviewed/Progressed HEP activities related to improving balance, coordination, kinesthetic sense, posture, motor skill, proprioception of core, proximal hip and LE for self care, mobility, lifting, and ambulation/stair navigation      Manual Treatments:  PROM / STM / Oscillations-Mobs:  G-I, II, III, IV (PA's, Inf., Post.)  [x] (43796) Provided manual therapy to mobilize LE, proximal hip and/or LS spine soft tissue/joints for the purpose of modulating pain, promoting relaxation,  increasing ROM, reducing/eliminating soft tissue swelling/inflammation/restriction, improving soft tissue extensibility and allowing for proper ROM for normal function with self care, mobility, lifting and ambulation. Modalities:      Charges:  Timed Code Treatment Minutes: 35   Total Treatment Minutes: 60     [] EVAL (LOW) 03413 (typically 20 minutes face-to-face)  [] EVAL (MOD) 03781 (typically 30 minutes face-to-face)  [] EVAL (HIGH) 62177 (typically 45 minutes face-to-face)  [] RE-EVAL     [x] MU(07857) x  1   [] IONTO  [] NMR (31674) x      [] VASO  [x] Manual (62775) x  1    [] Other:  [] TA x       [] Mech Traction (18143)  [] ES(attended) (43507)      [] ES (un) (82219):     GOALS:   Patient stated goal: To strengthen legs and decrease pain. Therapist goals for Patient:   Short Term Goals: To be achieved in: 2 weeks  1. Independent in HEP and progression per patient tolerance, in order to prevent re-injury. 2. Patient will have a decrease in pain to facilitate improvement in movement, function, and ADLs as indicated by Functional Deficits. Long Term Goals: To be achieved in: 4 weeks  1. Disability index score of 30% or less for the LEFS to assist with reaching prior level of function. 2. Patient will demonstrate increased AROM to WNL to allow for proper joint functioning as indicated by patients Functional Deficits.    3. Patient will demonstrate an increase in Strength to good proximal hip strength and control, within 5lb HHD in LE to allow for proper functional mobility as indicated by patients Functional Deficits. 4. Patient will return to prior functional activities without increased symptoms or restriction. 5. Patient will report walking for 30+ minutes without pain or feelings of weakness in B knees. Progression Towards Functional goals:  [x] Patient is progressing as expected towards functional goals listed. [] Progression is slowed due to complexities listed. [] Progression has been slowed due to co-morbidities. [] Plan just implemented, too soon to assess goals progression  [] Other:     ASSESSMENT:  Continued hypomobility noted throughout the throughout the patella in all directions. Patient tolerance for standing activities is showing improvements but remains limited.      Return to Play: (if applicable)   []  Stage 1: Intro to Strength   []  Stage 2: Return to Run and Strength   []  Stage 3: Return to Jump and Strength   []  Stage 4: Dynamic Strength and Agility   []  Stage 5: Sport Specific Training     []  Ready to Return to Play, Meets All Above Stages   []  Not Ready for Return to Sports   Comments:                         Treatment/Activity Tolerance:  [x] Patient tolerated treatment well [] Patient limited by fatique  [] Patient limited by pain  [] Patient limited by other medical complications  [] Other:     Prognosis: [x] Good [] Fair  [] Poor    Patient Requires Follow-up: [x] Yes  [] No      PLAN:  [] Continue per plan of care [] Alter current plan (see comments)  [x] Plan of care initiated [] Hold pending MD visit [] Discharge    Electronically signed by: Zoila Jones PT

## 2018-08-23 ENCOUNTER — HOSPITAL ENCOUNTER (OUTPATIENT)
Dept: PHYSICAL THERAPY | Age: 73
Discharge: HOME | End: 2018-08-24
Admitting: ORTHOPAEDIC SURGERY

## 2018-08-23 NOTE — FLOWSHEET NOTE
related to improving balance, coordination, kinesthetic sense, posture, motor skill, proprioception of core, proximal hip and LE for self care, mobility, lifting, and ambulation/stair navigation      Manual Treatments:  PROM / STM / Oscillations-Mobs:  G-I, II, III, IV (PA's, Inf., Post.)  [x] (74918) Provided manual therapy to mobilize LE, proximal hip and/or LS spine soft tissue/joints for the purpose of modulating pain, promoting relaxation,  increasing ROM, reducing/eliminating soft tissue swelling/inflammation/restriction, improving soft tissue extensibility and allowing for proper ROM for normal function with self care, mobility, lifting and ambulation. Modalities:      Charges:  Timed Code Treatment Minutes: 32   Total Treatment Minutes: 45     [] EVAL (LOW) 17498 (typically 20 minutes face-to-face)  [] EVAL (MOD) 65129 (typically 30 minutes face-to-face)  [] EVAL (HIGH) 27453 (typically 45 minutes face-to-face)  [] RE-EVAL     [x] FN(73043) x  1   [] IONTO  [] NMR (40736) x      [] VASO  [x] Manual (06905) x  1    [] Other:  [] TA x       [] Mech Traction (57463)  [] ES(attended) (75870)      [] ES (un) (04133):     GOALS:   Patient stated goal: To strengthen legs and decrease pain. Therapist goals for Patient:   Short Term Goals: To be achieved in: 2 weeks  1. Independent in HEP and progression per patient tolerance, in order to prevent re-injury. 2. Patient will have a decrease in pain to facilitate improvement in movement, function, and ADLs as indicated by Functional Deficits. Long Term Goals: To be achieved in: 4 weeks  1. Disability index score of 30% or less for the LEFS to assist with reaching prior level of function. 2. Patient will demonstrate increased AROM to WNL to allow for proper joint functioning as indicated by patients Functional Deficits.    3. Patient will demonstrate an increase in Strength to good proximal hip strength and control, within 5lb HHD in LE to allow for proper

## 2018-09-01 ENCOUNTER — HOSPITAL ENCOUNTER (OUTPATIENT)
Dept: OTHER | Age: 73
Discharge: HOME | End: 2018-09-01
Attending: ORTHOPAEDIC SURGERY | Admitting: ORTHOPAEDIC SURGERY

## 2018-09-07 ENCOUNTER — HOSPITAL ENCOUNTER (OUTPATIENT)
Dept: PHYSICAL THERAPY | Age: 73
Discharge: HOME | End: 2018-09-08
Admitting: ORTHOPAEDIC SURGERY

## 2018-09-07 NOTE — FLOWSHEET NOTE
[address] [place] [address] [place] [address]  Phone: [phone] Fax: [phone]    Physical Therapy Daily Treatment Note  Date:  2018    Patient Name:  Linda Ash    :  1945  MRN: [id number]  Restrictions/Precautions:    Medical/Treatment Diagnosis Information:  Diagnosis: Primary OA of both knees M17.0  Treatment Diagnosis: Bilateral knee pain/weakness  Insurance/Certification information:  PT Insurance Information: Medicare/ Hum[employer]  Physician Information:  Referring Practitioner: Ximena Cline  Plan of care signed (Y/N):     Date of Patient follow up with Physician:     G-Code (if applicable):      Date G-Code Applied:    PT G-Codes  Functional Assessment Tool Used: LEFS  Score: 54%  Functional Limitation: Mobility: Walking and moving around  Mobility: Walking and Moving Around Current Status (): At least 40 percent but less than 60 percent impaired, limited or restricted  Mobility: Walking and Moving Around Goal Status (): At least 20 percent but less than 40 percent impaired, limited or restricted    Progress Note: []  Yes  [x]  No  Next due by: Visit #10       Latex Allergy:  [x]NO      []YES  Preferred Language for Healthcare:   [x]English       []other:    Visit # Insurance Allowable   6 medicare     Pain level:  2/10     SUBJECTIVE:  Patient reports the knees yesterday had no pain. Today there is slight soreness in the right knee.      OBJECTIVE:   Observation:   Test measurements:      RESTRICTIONS/PRECAUTIONS:     Exercises/Interventions:     Therapeutic Ex  Sets/sec Reps Notes   Retro Stepper/BIKE 6 min  Onset of session   LAQ 1 15B #2   BFR      Sportcord March      3 way SLR    SAQ    Clam ABD      Hip Ext [place]      BOSU fwd/side lunge    squat 1 10B UE support   Leg Press Iso/Con/Ecc 0-      Cybex HS curl      TKE Glute side walks Beals above knees   Bridges      Slide Lunge      Slide HS eccentrics ambulation/stair navigation   [] (27465)Reviewed/Progressed HEP activities related to improving balance, coordination, kinesthetic sense, posture, motor skill, proprioception of core, proximal hip and LE for self care, mobility, lifting, and ambulation/stair navigation      Manual Treatments:  PROM / STM / Oscillations-Mobs:  G-I, II, III, IV (PA's, Inf., Post.)  [x] (25158) Provided manual therapy to mobilize LE, proximal hip and/or LS spine soft tissue/joints for the purpose of modulating pain, promoting relaxation,  increasing ROM, reducing/eliminating soft tissue swelling/inflammation/restriction, improving soft tissue extensibility and allowing for proper ROM for normal function with self care, mobility, lifting and ambulation. Modalities:      Charges:  Timed Code Treatment Minutes: 45   Total Treatment Minutes: 60     [] EVAL (LOW) 34904 (typically 20 minutes face-to-face)  [] EVAL (MOD) 74618 (typically 30 minutes face-to-face)  [] EVAL (HIGH) 68617 (typically 45 minutes face-to-face)  [] RE-EVAL     [x] RQ(50118) x  1   [] IONTO  [] NMR (24905) x      [] VASO  [x] Manual (09269) x  2    [] Other:  [] TA x       [] Mech Traction (60205)  [] ES(attended) (84086)      [] ES (un) (28022):     GOALS:   Patient stated goal: To strengthen legs and decrease pain. Therapist goals for Patient:   Short Term Goals: To be achieved in: 2 weeks  1. Independent in HEP and progression per patient tolerance, in order to prevent re-injury. 2. Patient will have a decrease in pain to facilitate improvement in movement, function, and ADLs as indicated by Functional Deficits. Long Term Goals: To be achieved in: 4 weeks  1. Disability index score of 30% or less for the LEFS to assist with reaching prior level of function. 2. Patient will demonstrate increased AROM to WNL to allow for proper joint functioning as indicated by patients Functional Deficits.    3. Patient will demonstrate an increase in Strength to good proximal hip strength and control, within 5lb HHD in LE to allow for proper functional mobility as indicated by patients Functional Deficits. 4. Patient will return to prior functional activities without increased symptoms or restriction. 5. Patient will report walking for 30+ minutes without pain or feelings of weakness in B knees. Progression Towards Functional goals:  [x] Patient is progressing as expected towards functional goals listed. [] Progression is slowed due to complexities listed. [] Progression has been slowed due to co-morbidities. [] Plan just implemented, too soon to assess goals progression  [] Other:     ASSESSMENT:  Steady progress in exercise tolerance at this time. Poor eccentric control of the quadriceps with stairs at this time. Improved activation when knee flexion sequencing with descending stairs was provided.        Return to Play: (if applicable)   []  Stage 1: Intro to Strength   []  Stage 2: Return to Run and Strength   []  Stage 3: Return to Jump and Strength   []  Stage 4: Dynamic Strength and Agility   []  Stage 5: Sport Specific Training     []  Ready to Return to Play, Meets All Above Stages   []  Not Ready for Return to Sports   Comments:                         Treatment/Activity Tolerance:  [x] Patient tolerated treatment well [] Patient limited by fatique  [] Patient limited by pain  [] Patient limited by other medical complications  [] Other:     Prognosis: [x] Good [] Fair  [] Poor    Patient Requires Follow-up: [x] Yes  [] No      PLAN:  [x] Continue per plan of care [] Alter current plan (see comments)  [] Plan of care initiated [] Hold pending MD visit [] Discharge    Electronically signed by: Lisa Abad PT

## 2018-09-11 ENCOUNTER — OFFICE VISIT (OUTPATIENT)
Dept: ORTHOPEDIC SURGERY | Age: 73
End: 2018-09-11

## 2018-09-11 VITALS — BODY MASS INDEX: 43.23 KG/M2 | WEIGHT: 244 LBS | HEIGHT: 63 IN

## 2018-09-11 DIAGNOSIS — M17.0 PRIMARY OSTEOARTHRITIS OF BOTH KNEES: Primary | ICD-10-CM

## 2018-09-11 PROCEDURE — 1036F TOBACCO NON-USER: CPT | Performed by: ORTHOPAEDIC SURGERY

## 2018-09-11 PROCEDURE — 1090F PRES/ABSN URINE INCON ASSESS: CPT | Performed by: ORTHOPAEDIC SURGERY

## 2018-09-11 PROCEDURE — G8400 PT W/DXA NO RESULTS DOC: HCPCS | Performed by: ORTHOPAEDIC SURGERY

## 2018-09-11 PROCEDURE — 3017F COLORECTAL CA SCREEN DOC REV: CPT | Performed by: ORTHOPAEDIC SURGERY

## 2018-09-11 PROCEDURE — 99213 OFFICE O/P EST LOW 20 MIN: CPT | Performed by: ORTHOPAEDIC SURGERY

## 2018-09-11 PROCEDURE — 1123F ACP DISCUSS/DSCN MKR DOCD: CPT | Performed by: ORTHOPAEDIC SURGERY

## 2018-09-11 PROCEDURE — G8417 CALC BMI ABV UP PARAM F/U: HCPCS | Performed by: ORTHOPAEDIC SURGERY

## 2018-09-11 PROCEDURE — G8427 DOCREV CUR MEDS BY ELIG CLIN: HCPCS | Performed by: ORTHOPAEDIC SURGERY

## 2018-09-11 PROCEDURE — 1101F PT FALLS ASSESS-DOCD LE1/YR: CPT | Performed by: ORTHOPAEDIC SURGERY

## 2018-09-11 PROCEDURE — 4040F PNEUMOC VAC/ADMIN/RCVD: CPT | Performed by: ORTHOPAEDIC SURGERY

## 2018-09-11 RX ORDER — MELOXICAM 15 MG/1
15 TABLET ORAL DAILY
Qty: 30 TABLET | Refills: 3 | Status: SHIPPED | OUTPATIENT
Start: 2018-09-11 | End: 2019-02-12 | Stop reason: SDUPTHER

## 2018-09-11 NOTE — PROGRESS NOTES
Chief Complaint    Follow-up (bilat knees)      History of Present Illness:  Alexa Watkins is a 67 y.o. female. She is here for follow-up for her bilateral knees. Overall states she's had improvement with physical therapy. Feels like she is stronger both of her knees. Feels that she is able to do a little bit more activity. She still is limited especially with walking. She states she has a lot of pain initially when she does 1st and initiate movement. She is interested in total knee arthroplasty. She has done viscose supplementation injections in the past.  She does use meloxicam on a regular basis. Medical History:  Patient's medications, allergies, past medical, surgical, social and family histories were reviewed and updated as appropriate. Review of Systems:  Pertinent items are noted in HPI  Review of systems reviewed from Patient History Form dated on 9/11/18 and available in the patient's chart under the Media tab. Vital Signs:  Ht 5' 3\" (1.6 m)   Wt 244 lb (110.7 kg)   BMI 43.22 kg/m²     General Exam:   Constitutional: Patient is adequately groomed with no evidence of malnutrition  DTRs: Deep tendon reflexes are intact  Mental Status: The patient is oriented to time, place and person. The patient's mood and affect are appropriate. Knee Examination:    Inspection:  She does have an overall varus alignment of bilateral knees.     Palpation:  There is tenderness palpation along the medial joint line of the bilateral knees. Palpable crepitus over the patellofemoral joint range of motion     Range of Motion:  Right knee is 7-10° short of full extension, left knee is 5° short of full extension. Both knees flex to 110°     Strength: To do straight leg raise     Special Tests:  She does have some pseudo-valgus laxity bilaterally.   Negative posterior drawer.     Skin: There are no rashes, ulcerations or lesions.     Gait: Walking with an antalgic gait    Additional Comments:

## 2019-02-12 DIAGNOSIS — M17.0 PRIMARY OSTEOARTHRITIS OF BOTH KNEES: Primary | ICD-10-CM

## 2019-02-12 RX ORDER — MELOXICAM 15 MG/1
TABLET ORAL
Qty: 30 TABLET | Refills: 3 | Status: SHIPPED | OUTPATIENT
Start: 2019-02-12 | End: 2019-11-14

## 2019-11-14 ENCOUNTER — APPOINTMENT (OUTPATIENT)
Dept: CT IMAGING | Age: 74
End: 2019-11-14
Payer: MEDICARE

## 2019-11-14 ENCOUNTER — HOSPITAL ENCOUNTER (EMERGENCY)
Age: 74
Discharge: HOME | End: 2019-11-14
Attending: EMERGENCY MEDICINE
Payer: MEDICARE

## 2019-11-14 ENCOUNTER — APPOINTMENT (OUTPATIENT)
Dept: GENERAL RADIOLOGY | Age: 74
End: 2019-11-14
Payer: MEDICARE

## 2019-11-14 VITALS
DIASTOLIC BLOOD PRESSURE: 67 MMHG | BODY MASS INDEX: 40.85 KG/M2 | HEIGHT: 62 IN | RESPIRATION RATE: 16 BRPM | TEMPERATURE: 97.8 F | WEIGHT: 222 LBS | SYSTOLIC BLOOD PRESSURE: 118 MMHG | OXYGEN SATURATION: 96 % | HEART RATE: 86 BPM

## 2019-11-14 DIAGNOSIS — S81.011A KNEE LACERATION, RIGHT, INITIAL ENCOUNTER: Primary | ICD-10-CM

## 2019-11-14 DIAGNOSIS — S01.81XA FOREHEAD LACERATION, INITIAL ENCOUNTER: ICD-10-CM

## 2019-11-14 PROCEDURE — 6360000002 HC RX W HCPCS: Performed by: EMERGENCY MEDICINE

## 2019-11-14 PROCEDURE — 73562 X-RAY EXAM OF KNEE 3: CPT

## 2019-11-14 PROCEDURE — 90715 TDAP VACCINE 7 YRS/> IM: CPT | Performed by: EMERGENCY MEDICINE

## 2019-11-14 PROCEDURE — 2500000003 HC RX 250 WO HCPCS: Performed by: EMERGENCY MEDICINE

## 2019-11-14 PROCEDURE — 72125 CT NECK SPINE W/O DYE: CPT

## 2019-11-14 PROCEDURE — 4500000024 HC ED LEVEL 4 PROCEDURE

## 2019-11-14 PROCEDURE — 99284 EMERGENCY DEPT VISIT MOD MDM: CPT

## 2019-11-14 PROCEDURE — 4500000021 HC ED LEVEL 1 PROCEDURE

## 2019-11-14 PROCEDURE — 70450 CT HEAD/BRAIN W/O DYE: CPT

## 2019-11-14 PROCEDURE — 90471 IMMUNIZATION ADMIN: CPT | Performed by: EMERGENCY MEDICINE

## 2019-11-14 RX ORDER — CEPHALEXIN 500 MG/1
500 CAPSULE ORAL 2 TIMES DAILY
Qty: 20 CAPSULE | Refills: 0 | Status: SHIPPED | OUTPATIENT
Start: 2019-11-14 | End: 2019-11-24

## 2019-11-14 RX ORDER — LIDOCAINE HYDROCHLORIDE 10 MG/ML
5 INJECTION, SOLUTION EPIDURAL; INFILTRATION; INTRACAUDAL; PERINEURAL ONCE
Status: COMPLETED | OUTPATIENT
Start: 2019-11-14 | End: 2019-11-14

## 2019-11-14 RX ORDER — LISINOPRIL 20 MG/1
TABLET ORAL
COMMUNITY
Start: 2019-08-28

## 2019-11-14 RX ADMIN — TETANUS TOXOID, REDUCED DIPHTHERIA TOXOID AND ACELLULAR PERTUSSIS VACCINE, ADSORBED 0.5 ML: 5; 2.5; 8; 8; 2.5 SUSPENSION INTRAMUSCULAR at 11:03

## 2019-11-14 RX ADMIN — LIDOCAINE HYDROCHLORIDE 5 ML: 10 INJECTION, SOLUTION EPIDURAL; INFILTRATION; INTRACAUDAL; PERINEURAL at 10:30

## 2019-11-14 ASSESSMENT — ENCOUNTER SYMPTOMS
ABDOMINAL PAIN: 0
BACK PAIN: 0
DIARRHEA: 0
EYES NEGATIVE: 1
COUGH: 0
CONSTIPATION: 0
NAUSEA: 0
SHORTNESS OF BREATH: 0

## 2019-11-14 ASSESSMENT — PAIN DESCRIPTION - PAIN TYPE: TYPE: ACUTE PAIN

## 2019-11-14 ASSESSMENT — PAIN DESCRIPTION - DESCRIPTORS: DESCRIPTORS: TIGHTNESS

## 2019-11-14 ASSESSMENT — PAIN SCALES - GENERAL
PAINLEVEL_OUTOF10: 2
PAINLEVEL_OUTOF10: 1

## 2019-11-14 ASSESSMENT — PAIN DESCRIPTION - LOCATION: LOCATION: NECK

## 2022-11-10 ENCOUNTER — HOSPITAL ENCOUNTER (OUTPATIENT)
Dept: PHYSICAL THERAPY | Age: 77
Setting detail: THERAPIES SERIES
Discharge: HOME | End: 2022-11-10
Payer: MEDICARE

## 2022-11-10 DIAGNOSIS — Z78.9 DECREASED ACTIVITIES OF DAILY LIVING (ADL): Primary | ICD-10-CM

## 2022-11-10 PROCEDURE — 97110 THERAPEUTIC EXERCISES: CPT

## 2022-11-10 PROCEDURE — 97161 PT EVAL LOW COMPLEX 20 MIN: CPT

## 2022-11-10 NOTE — FLOWSHEET NOTE
[facility], [place] [address] [address]  Phone: [phone]   Fax:     [phone]      Physical Therapy Treatment Note/ Progress Report:     Date:  11/10/2022    Patient Name:  Polo Flowers    :  1945  MRN: [id number]    Pertinent Medical History:     Referring Provider:   Dr. Severiano Duncans                                    Evaluation Date: 11/10/2022                                               Medical Diagnosis:  Low back pain, unspecified [M54.50]  Other chronic pain [G89.29]     Treatment Diagnosis:      ICD-10-CM     1. Decreased activities of daily living (ADL)  Z78.9                                      Insurance information: Medical [place]    Plan of care signed (Y/N): N    Date of Patient follow up with Physician:      Progress Report: []  Yes  [x]  No     Date Range for reporting period:  Beginnin/10/22  Ending:      Progress report due (10 Rx/or 30 days whichever is less):     Recertification due (POC duration/ or 90 days whichever is less):     Visit # POC/Insurance Allowable Auth Needed   1 12 []Yes   [x]No     Latex Allergy:  [x]NO      []YES  Preferred Language for Healthcare:   [x]English       []other:  Functional Scale: FOTO Lumbar = 45    Pain level:  6-8/10 lumbar    History of Injury: Patient stated she was referred to PT for pain. Pt stated \"since Covid hit I really haven't done anything. \"  Pt has B lumbar pain. Pt denied numbness/tingling. Pt stated she has limits in standing tolerance to 20 minutes when cooking. Pt has trouble walking \"because of the knees. \"  Pt stated sitting is comfortable, lying is comfortable. Pt stated she has bone on bone problems with her knees.     SUBJECTIVE:  See eval    OBJECTIVE:  Observation:   Posture: flexed, forward shoulders, decreased lumbar lordosis  Functional Mobility/Transfers: I, guarded rolling (fear of falling Adduction Isometric with Ball - 1 x daily - 7 x weekly - 10 reps - 5 hold  Hooklying Single Leg March - 1 x daily - 7 x weekly - 10 reps    Therapeutic Exercise and NMR EXR  [] (48906) Provided verbal/tactile cueing for activities related to strengthening, flexibility, endurance, ROM for improvements in LE, proximal hip, and core control with self care, mobility, lifting, ambulation.  [] (73231) Provided verbal/tactile cueing for activities related to improving balance, coordination, kinesthetic sense, posture, motor skill, proprioception  to assist with LE, proximal hip, and core control in self care, mobility, lifting, ambulation and eccentric single leg control.      NMR and Therapeutic Activities:    [] (31460 or 68429) Provided verbal/tactile cueing for activities related to improving balance, coordination, kinesthetic sense, posture, motor skill, proprioception and motor activation to allow for proper function of core, proximal hip and LE with self care and ADLs and functional mobility.   [] (20054) Gait Re-education- Provided training and instruction to the patient for proper LE, core and proximal hip recruitment and positioning and eccentric body weight control with ambulation re-education including up and down stairs     Home Exercise Program:    [x] (22793) Reviewed/Progressed HEP activities related to strengthening, flexibility, endurance, ROM of core, proximal hip and LE for functional self-care, mobility, lifting and ambulation/stair navigation   [] (46152)Reviewed/Progressed HEP activities related to improving balance, coordination, kinesthetic sense, posture, motor skill, proprioception of core, proximal hip and LE for self care, mobility, lifting, and ambulation/stair navigation      Manual Treatments:  PROM / STM / Oscillations-Mobs:  G-I, II, III, IV (PA's, Inf., Post.)  [] (11292) Provided manual therapy to mobilize LE, proximal hip and/or LS spine soft tissue/joints for the purpose of modulating pain, promoting relaxation,  increasing ROM, reducing/eliminating soft tissue swelling/inflammation/restriction, improving soft tissue extensibility and allowing for proper ROM for normal function with self care, mobility, lifting and ambulation. Charges:  Timed Code Treatment Minutes: 20   Total Treatment Minutes: 40      [x] EVAL (LOW) 07771 (typically 20 minutes face-to-face)  [] EVAL (MOD) 12328 (typically 30 minutes face-to-face)  [] EVAL (HIGH) 52310 (typically 45 minutes face-to-face)  [] RE-EVAL     [x] RR(83982) x     [] Dry needle 1 or 2 Muscles (24677)  [] NMR (70038) x     [] Dry needle 3+ Muscles (57566)  [] Manual (96088) x     [] Ultrasound (64358) x  [] TA (72100) x     [] Mech Traction (63308)  [] ES(attended) (67400)     [] ES (un) (80347):   [] Vasopump (87498) [] Ionto (36724)   [] Other:    GOALS:  Patient stated goal: \"walk more than room to room, love to go in to a store\"; pt likes to dance  [] Progressing: [] Met: [] Not Met: [] Adjusted     Therapist goals for Patient:   Short Term Goals: To be achieved in: 2 weeks  1. Independent in HEP and progression per patient tolerance, in order to prevent re-injury. [] Progressing: [] Met: [] Not Met: [] Adjusted  2. Patient will have a decrease in pain to facilitate improvement in movement, function, and ADLs as indicated by Functional Deficits. [] Progressing: [] Met: [] Not Met: [] Adjusted     Long Term Goals: To be achieved in: 6 weeks  1. FOTO Lumbar Spine will score 52 to assist with reaching prior level of function. [] Progressing: [] Met: [] Not Met: [] Adjusted  2. Patient will demonstrate increased trunk AROM to full flexion to allow for proper joint functioning as indicated by patients Functional Deficits. [] Progressing: [] Met: [] Not Met: [] Adjusted  3.  Patient will demonstrate an increase in Strength to good proximal hip strength and control, within 5lb HHD in LE to allow for proper functional mobility as indicated by patients Functional Deficits. [] Progressing: [] Met: [] Not Met: [] Adjusted  4. Patient will return to washing dishes for 20 mintues without increased symptoms or restriction. [] Progressing: [] Met: [] Not Met: [] Adjusted  5. Patient will be able to grocery shop for 20 minutes without aggravating pain. [] Progressing: [] Met: [] Not Met: [] Adjusted         ASSESSMENT:  See eval    Treatment/Activity Tolerance:  [x] Patient tolerated treatment well [] Patient limited by fatique  [] Patient limited by pain  [] Patient limited by other medical complications  [] Other:     Overall Progression Towards Functional goals/ Treatment Progress Update:  [] Patient is progressing as expected towards functional goals listed. [] Progression is slowed due to complexities/Impairments listed. [] Progression has been slowed due to co-morbidities. [x] Plan just implemented, too soon to assess goals progression <30days   [] Goals require adjustment due to lack of progress  [] Patient is not progressing as expected and requires additional follow up with physician  [] Other    Prognosis for POC: [x] Good [] Fair  [] Poor    Patient requires continued skilled intervention: [x] Yes  [] No        PLAN: Begin STM lumbar in either SL or sitting, begin NuStep, progress hook lying, sitting, standing strengthening, work hip ROM  [] Continue per plan of care [] Alter current plan (see comments)  [x] Plan of care initiated [] Hold pending MD visit [] Discharge    Electronically signed by: Luis Patel PT , OMT-C, [id number]      Note: If patient does not return for scheduled/recommended follow up visits, this note will serve as a discharge from care along with the most recent update on progress.

## 2022-11-10 NOTE — PROGRESS NOTES
[place] [facility], [facility] [address] [address]  Phone: [phone]   Fax:     [phone]                                                       Ghulam Ghosh    Dear Dr. Alena Fonseca,    We had the pleasure of evaluating the following patient for physical therapy services at [facility]. A summary of our findings can be found in the initial assessment below. This includes our plan of care. If you have any questions or concerns regarding these findings, please do not hesitate to contact me at the office phone number checked above. Thank you for the referral.       Physician Signature:_______________________________Date:__________________  By signing above (or electronic signature), therapists plan is approved by physician        Patient: Kelly Soria   : 1945   MRN: [id number]  Referring Provider:   Dr. Alena Fonseca     Evaluation Date: 11/10/2022              Medical Diagnosis:  Low back pain, unspecified [M54.50]  Other chronic pain [G89.29]    Treatment Diagnosis:    ICD-10-CM    1. Decreased activities of daily living (ADL)  Z78.9                                    Insurance information: Medical [place]       Precautions/ Contra-indications: DM, B shoulder surgeries, R knee surgery  Latex Allergy:  [x]NO      []YES  Preferred Language for Healthcare:   [x]English       []other:    C-SSRS Triggered by Intake questionnaire (Past 2 wk assessment ):   [] No, Questionnaire did not trigger screening. [x] Yes, Patient intake triggered C-SSRS Screening      [x] C-SSRS Screening completed  [] PCP notified via Epic     SUBJECTIVE: Patient stated she was referred to PT for pain. Pt stated \"since Covid hit I really haven't done anything. \"  Pt has B lumbar pain. Pt denied numbness/tingling.   Pt stated she has limits in standing tolerance to 20 minutes when cooking. Pt has trouble walking \"because of the knees. \"  Pt stated sitting is comfortable, lying is comfortable. Pt stated she has bone on bone problems with her knees. Relevant Medical History:see below  Functional Scale/Score: FOTO lumbar spine = 45     Pain Scale: 6-8/10 when standing, 0/10 when sitting    Type: []Constant   [x]Intermittent  []Radiating []Localized []other:     Numbness/Tingling: none    Occupation/School: retired    Living Status/Prior Level of Function: Independent with ADLs and IADLs    OBJECTIVE:   Posture: flexed, forward shoulders, decreased lumbar lordosis    Functional Mobility/Transfers: I, guarded rolling (fear of falling off of treatment plinth)     Palpation: TTP B lumbar paraspinals    Bandages/Dressings/Incisions: NA    Gait: (include devices/WB status) slow and guarded, decreased B step length and trunk rotation    ROM LEFT RIGHT   HIP flex/ABD/ER [facility] WFL   Hip IR, EXT limited limited   Knee  [facility]   Ankle [facility] WFL   Trunk rotation [facility] WFL   Trunk SB WFL WFL   Trunk flexion WFL    Trunk Ext Decreased 75%, no pain    Quadrant (-) (-)   Strength  LEFT RIGHT   HIP Flexors 5/5 5/5   Hip Adductors 4/5 4/5   HIP Abductors 4+/5 4+/5   HIP Ext 3+/5 3+/5   Hip ER     Knee EXT (quad) 5/5 5/5   Knee Flex (HS) 5/5 5/5   Ankle DF 5/5 5/5   Ankle PF 5/5 5/5   Ankle Inv 5/5 5/5   Ankle EV 5/5 5/5     Reflexes/Sensation:    [x]Dermatomes/Myotomes intact    [x]Reflexes equal and normal bilaterally   []Other:    Joint mobility: Pain central and L unilateral L4/5 and L5/S1   []Normal    [x]Hypo- throughout lumbar spine; B hips grossly limited   []Hyper                         [x] Patient history, allergies, meds reviewed. Medical chart reviewed. See intake form. Review Of Systems (ROS):  [x]Performed Review of systems (Integumentary, CardioPulmonary, Neurological) by intake and observation. Intake form has been scanned into medical record.  Patient has been instructed to contact their primary care physician regarding ROS issues if not already being addressed at this time. Co-morbidities/Complexities (which will affect course of rehabilitation):   []None           Arthritic conditions   []Rheumatoid arthritis (M05.9)  [x]Osteoarthritis (M19.91)   Cardiovascular conditions   [x]Hypertension (I10)  []Hyperlipidemia (E78.5)  []Angina pectoris (I20)  []Atherosclerosis (I70)  []CVA Musculoskeletal conditions   []Disc pathology   []Congenital spine pathologies   []Prior surgical intervention  []Osteoporosis (M81.8)  []Osteopenia (M85.8)   Endocrine conditions   []Hypothyroid (E03.9)  []Hyperthyroid Gastrointestinal conditions   []Constipation (J87.64)   Metabolic conditions   []Morbid obesity (E66.01)  [x]Diabetes type 1(E10.65) or 2 (E11.65)   []Neuropathy (G60.9)     Pulmonary conditions   []Asthma (J45)  []Coughing   []COPD (J44.9)   Psychological Disorders  [x]Anxiety (F41.9)  [x]Depression (F32.9)   []Other:   [x]Other:   Bowel/bladder problems, pt stated related to diabetes, for 1 year    2008 R knee surgery  L and R shoulder surgeries       Barriers to/and or personal factors that will affect rehab potential:              []Age  []Sex    []Smoker              []Motivation/Lack of Motivation                        []Co-Morbidities              []Cognitive Function, education/learning barriers              []Environmental, home barriers              []profession/work barriers  []past PT/medical experience  []other:  Justification:     Falls Risk Assessment (30 days):   [x] Falls Risk assessed and no intervention required.   [] Falls Risk assessed and Patient requires intervention due to being higher risk   TUG score (>12s at risk):     [] Falls education provided, including         ASSESSMENT:   Functional Impairments:     [x]Noted lumbar/proximal hip/LE hypomobility   [x]Decreased LE functional ROM   [x]Decreased core/proximal hip strength and neuromuscular control   [x]Decreased LE functional strength   [x]Reduced balance/proprioceptive control   []other:      Functional Activity Limitations (from functional questionnaire and intake)   [x]Reduced ability to tolerate prolonged functional positions   [x]Reduced ability or difficulty with changes of positions or transfers between positions   [x]Reduced ability to maintain good posture and demonstrate good body mechanics with sitting, bending, and lifting   [x]Reduced ability to sleep   [] Reduced ability or tolerance with driving and/or computer work   [x]Reduced ability to perform lifting, carrying tasks   [x]Reduced ability to squat   [x]Reduced ability to forward bend   [x]Reduced ability to ambulate prolonged functional periods/distances/surfaces   [x]Reduced ability to ascend/descend stairs   []Reduced ability to run, hop or jump   []other:     Participation Restrictions   [x]Reduced participation in self care activities   [x]Reduced participation in home management activities   []Reduced participation in work activities   [x]Reduced participation in social activities. [x]Reduced participation in sport activities. Classification :    []Signs/symptoms consistent with post-surgical status including decreased ROM, strength and function.    []Signs/symptoms consistent with joint sprain/strain   []Signs/symptoms consistent with patella-femoral syndrome   [x]Signs/symptoms consistent with knee OA/hip OA/lumbar OA   []Signs/symptoms consistent with internal derangement of knee/Hip   []Signs/symptoms consistent with functional hip weakness/NMR control      []Signs/symptoms consistent with tendinitis/tendinosis    []signs/symptoms consistent with pathology which may benefit from Dry needling      []other:      Prognosis/Rehab Potential:      []Excellent   [x]Good    []Fair   []Poor    Tolerance of evaluation/treatment:    []Excellent   [x]Good    []Fair   []Poor    Physical Therapy Evaluation Complexity Justification  [x] A history of present problem with:  [] no personal factors and/or comorbidities that impact the plan of care;  []1-2 personal factors and/or comorbidities that impact the plan of care  [x]3 personal factors and/or comorbidities that impact the plan of care  [x] An examination of body systems using standardized tests and measures addressing any of the following: body structures and functions (impairments), activity limitations, and/or participation restrictions;:  [] a total of 1-2 or more elements   [x] a total of 3 or more elements   [] a total of 4 or more elements   [x] A clinical presentation with:  [x] stable and/or uncomplicated characteristics   [] evolving clinical presentation with changing characteristics  [] unstable and unpredictable characteristics;   [x] Clinical decision making of [] low, [] moderate, [] high complexity using standardized patient assessment instrument and/or measurable assessment of functional outcome. [x] EVAL (LOW) 84484 (typically 20 minutes face-to-face)  [] EVAL (MOD) 29000 (typically 30 minutes face-to-face)  [] EVAL (HIGH) 31788 (typically 45 minutes face-to-face)  [] RE-EVAL     PLAN:  Frequency/Duration:  1-2 days per week for 6 Weeks:  Interventions:  [x]  Therapeutic exercise including: strength training, ROM, for Lower extremity and core   [x]  NMR activation and proprioception for LE, Glutes and Core   [x]  Manual therapy as indicated for LE, Hip and spine to include: Dry Needling/IASTM, STM, PROM, Gr I-IV mobilizations, manipulation. [x] Modalities as needed that may include: thermal agents, E-stim, Biofeedback, US, iontophoresis as indicated  [x] Patient education on joint protection, postural re-education, activity modification, progression of HEP. HEP instruction:  Access Code: [email or web address]  URL: [email or web address]  Date: 11/10/2022  Prepared by: Anthony Fraga    Exercises  Hooklying Single Knee to Chest Stretch - 1 x daily - 7 x weekly - 1 reps - 30 hold  Supine Lower Trunk Rotation - 1 x daily - 7 x weekly - 10 reps  Bent Knee Fallouts - 1 x daily - 7 x weekly - 10 reps  Supine Hip Adduction Isometric with Ball - 1 x daily - 7 x weekly - 10 reps - 5 hold  Hooklying Single Leg March - 1 x daily - 7 x weekly - 10 reps      GOALS:  Patient stated goal: \"walk more than room to room, love to go in to a store\"; pt likes to dance  [] Progressing: [] Met: [] Not Met: [] Adjusted    Therapist goals for Patient:   Short Term Goals: To be achieved in: 2 weeks  1. Independent in HEP and progression per patient tolerance, in order to prevent re-injury. [] Progressing: [] Met: [] Not Met: [] Adjusted  2. Patient will have a decrease in pain to facilitate improvement in movement, function, and ADLs as indicated by Functional Deficits. [] Progressing: [] Met: [] Not Met: [] Adjusted    Long Term Goals: To be achieved in: 6 weeks  1. FOTO Lumbar Spine will score 52 to assist with reaching prior level of function. [] Progressing: [] Met: [] Not Met: [] Adjusted  2. Patient will demonstrate increased trunk AROM to full flexion to allow for proper joint functioning as indicated by patients Functional Deficits. [] Progressing: [] Met: [] Not Met: [] Adjusted  3. Patient will demonstrate an increase in Strength to good proximal hip strength and control, within 5lb HHD in LE to allow for proper functional mobility as indicated by patients Functional Deficits. [] Progressing: [] Met: [] Not Met: [] Adjusted  4. Patient will return to washing dishes for 20 mintues without increased symptoms or restriction. [] Progressing: [] Met: [] Not Met: [] Adjusted  5. Patient will be able to grocery shop for 20 minutes without aggravating pain.   [] Progressing: [] Met: [] Not Met: [] Adjusted     Electronically signed by:  Pedro Sparks, PT , OMT-C, [id number]        Note: If patient does not return for scheduled/recommended follow up visits, this note will serve as a discharge from care along with the most recent update on progress.

## 2022-11-14 ENCOUNTER — HOSPITAL ENCOUNTER (OUTPATIENT)
Dept: PHYSICAL THERAPY | Age: 77
Setting detail: THERAPIES SERIES
Discharge: HOME | End: 2022-11-14
Payer: MEDICARE

## 2022-11-14 PROCEDURE — 97110 THERAPEUTIC EXERCISES: CPT

## 2022-11-14 PROCEDURE — 97140 MANUAL THERAPY 1/> REGIONS: CPT

## 2022-11-14 PROCEDURE — 97112 NEUROMUSCULAR REEDUCATION: CPT

## 2022-11-14 NOTE — FLOWSHEET NOTE
[facility] - Outpatient Rehabilitation and Therapy, [address] [place] [address]  Phone: [phone]   Fax:     [phone]      Physical Therapy Treatment Note/ Progress Report:     Date:  2022    Patient Name:  Graciela Sandhu    :  1945  MRN: [id number]    Pertinent Medical History:     Referring Provider:   Dr. Maribel Liang                                    Evaluation Date: 11/10/2022                                               Medical Diagnosis:  Low back pain, unspecified [M54.50]  Other chronic pain [G89.29]     Treatment Diagnosis:      ICD-10-CM     1. Decreased activities of daily living (ADL)  Z78.9                                      Insurance information: [employer] [place]    Plan of care signed (Y/N): N    Date of Patient follow up with Physician:      Progress Report: []  Yes  [x]  No     Date Range for reporting period:  Beginnin/10/22  Ending:      Progress report due (10 Rx/or 30 days whichever is less):     Recertification due (POC duration/ or 90 days whichever is less):     Visit # POC/Insurance Allowable Auth Needed   2 12 []Yes   [x]No     Latex Allergy:  [x]NO      []YES  Preferred Language for Healthcare:   [x]English       []other:  Functional Scale: FOTO Lumbar = 45    Pain level:  6-8/10 lumbar    History of Injury: Patient stated she was referred to PT for pain. Pt stated \"since Covid hit I really haven't done anything. \"  Pt has B lumbar pain. Pt denied numbness/tingling. Pt stated she has limits in standing tolerance to 20 minutes when cooking. Pt has trouble walking \"because of the knees. \"  Pt stated sitting is comfortable, lying is comfortable. Pt stated she has bone on bone problems with her knees.     SUBJECTIVE:  See eval  22 \"My Baker's cyst is just killing me today,\" has not \"noticed it too badly\" regarding back pain; performing HEP once/day    OBJECTIVE:  Observation: Posture: flexed, forward shoulders, decreased lumbar lordosis  Functional Mobility/Transfers: I, guarded rolling (fear of falling off of treatment plinth)   Palpation: TTP B lumbar paraspinals  Gait: (include devices/WB status) slow and guarded, decreased B step length and trunk rotation     Test measurements:    ROM LEFT RIGHT   HIP flex/ABD/ER [facility]   Hip IR, EXT limited limited   Knee  [facility]   Ankle [facility] WFL   Trunk rotation [facility] WFL   Trunk SB WFL WFL   Trunk flexion WFL     Trunk Ext Decreased 75%, no pain     Quadrant (-) (-)   Strength  LEFT RIGHT   HIP Flexors 5/5 5/5   Hip Adductors 4/5 4/5   HIP Abductors 4+/5 4+/5   HIP Ext 3+/5 3+/5   Hip ER       Knee EXT (quad) 5/5 5/5   Knee Flex (HS) 5/5 5/5   Ankle DF 5/5 5/5   Ankle PF 5/5 5/5   Ankle Inv 5/5 5/5   Ankle EV 5/5 5/5       RESTRICTIONS/PRECAUTIONS: DM, B shoulder surgery, R knee surgery    Exercises/Interventions:     Therapeutic Ex (37526)   Min: 15 Reps/Resistance Notes/CUES   Nu Step 5', seat 6, arm 8    Stretch gastroc incline 2x30\"    Stretch hamstring  Standing  Sitting   1x30\" L/R  1x30\" L/R Pt preferred sitting HS stretch to standing- next session perform just sitting HS stretch   Hip AAROM   Flexion, IR/ER 10x each L/R                   Manual Intervention (88379)  Min: 15     STM Lumbar B 15' In sitting                            NMR re-education (29228)  Min: 15  CUES NEEDED   T-slide  Rows  Sitting trunk flexion   Green, 2x10  Green, 2x10    Hook lying  March  LTR stretch   10x  1x30\" L/R    Piriformis stretch 1x30\" L/R    Therapeutic Activity (62099)  Min:               Modalities  Min:     IFC with      CP after exercises     MH after exercises            Other Therapeutic Activities: Pt was educated on PT POC, Diagnosis, Prognosis, pathomechanics as well as frequency and duration of scheduling future physical therapy appointments. Time was also taken on this day to answer all patient questions and participation in PT.  Reviewed appointment policy in detail with patient and patient verbalized understanding. Home Exercise Program: Patient was instructed in the following for HEP:       Access Code: [email or web address]  URL: [email or web address]  Date: 11/10/2022  Prepared by: Marilyn Zelaya     Exercises  Hooklying Single Knee to Chest Stretch - 1 x daily - 7 x weekly - 1 reps - 30 hold  Supine Lower Trunk Rotation - 1 x daily - 7 x weekly - 10 reps  Bent Knee Fallouts - 1 x daily - 7 x weekly - 10 reps  Supine Hip Adduction Isometric with Ball - 1 x daily - 7 x weekly - 10 reps - 5 hold  Hooklying Single Leg March - 1 x daily - 7 x weekly - 10 reps    Therapeutic Exercise and NMR EXR  [] (20266) Provided verbal/tactile cueing for activities related to strengthening, flexibility, endurance, ROM for improvements in LE, proximal hip, and core control with self care, mobility, lifting, ambulation.  [] (81808) Provided verbal/tactile cueing for activities related to improving balance, coordination, kinesthetic sense, posture, motor skill, proprioception  to assist with LE, proximal hip, and core control in self care, mobility, lifting, ambulation and eccentric single leg control.      NMR and Therapeutic Activities:    [] (94293 or 83230) Provided verbal/tactile cueing for activities related to improving balance, coordination, kinesthetic sense, posture, motor skill, proprioception and motor activation to allow for proper function of core, proximal hip and LE with self care and ADLs and functional mobility.   [] (90965) Gait Re-education- Provided training and instruction to the patient for proper LE, core and proximal hip recruitment and positioning and eccentric body weight control with ambulation re-education including up and down stairs     Home Exercise Program:    [x] (23386) Reviewed/Progressed HEP activities related to strengthening, flexibility, endurance, ROM of core, proximal hip and LE for functional self-care, mobility, lifting and ambulation/stair navigation   [] (21983)Reviewed/Progressed HEP activities related to improving balance, coordination, kinesthetic sense, posture, motor skill, proprioception of core, proximal hip and LE for self care, mobility, lifting, and ambulation/stair navigation      Manual Treatments:  PROM / STM / Oscillations-Mobs:  G-I, II, III, IV (PA's, Inf., Post.)  [] (58852) Provided manual therapy to mobilize LE, proximal hip and/or LS spine soft tissue/joints for the purpose of modulating pain, promoting relaxation,  increasing ROM, reducing/eliminating soft tissue swelling/inflammation/restriction, improving soft tissue extensibility and allowing for proper ROM for normal function with self care, mobility, lifting and ambulation. Charges:  Timed Code Treatment Minutes: 20   Total Treatment Minutes: 40      [x] EVAL (LOW) 49983 (typically 20 minutes face-to-face)  [] EVAL (MOD) 24973 (typically 30 minutes face-to-face)  [] EVAL (HIGH) 69856 (typically 45 minutes face-to-face)  [] RE-EVAL     [x] VK(96161) x     [] Dry needle 1 or 2 Muscles (10670)  [] NMR (24044) x     [] Dry needle 3+ Muscles (15810)  [] Manual (28167) x     [] Ultrasound (73528) x  [] TA (87856) x     [] Mech Traction (25494)  [] ES(attended) (01209)     [] ES (un) (05089):   [] Vasopump (45505) [] Ionto (44846)   [] Other:    GOALS:  Patient stated goal: \"walk more than room to room, love to go in to a store\"; pt likes to dance  [] Progressing: [] Met: [] Not Met: [] Adjusted     Therapist goals for Patient:   Short Term Goals: To be achieved in: 2 weeks  1. Independent in HEP and progression per patient tolerance, in order to prevent re-injury. [] Progressing: [] Met: [] Not Met: [] Adjusted  2. Patient will have a decrease in pain to facilitate improvement in movement, function, and ADLs as indicated by Functional Deficits. [] Progressing: [] Met: [] Not Met: [] Adjusted     Long Term Goals: To be achieved in: 6 weeks  1.  FOTO Lumbar Spine will score 52 to assist with reaching prior level of function. [] Progressing: [] Met: [] Not Met: [] Adjusted  2. Patient will demonstrate increased trunk AROM to full flexion to allow for proper joint functioning as indicated by patients Functional Deficits. [] Progressing: [] Met: [] Not Met: [] Adjusted  3. Patient will demonstrate an increase in Strength to good proximal hip strength and control, within 5lb HHD in LE to allow for proper functional mobility as indicated by patients Functional Deficits. [] Progressing: [] Met: [] Not Met: [] Adjusted  4. Patient will return to washing dishes for 20 mintues without increased symptoms or restriction. [] Progressing: [] Met: [] Not Met: [] Adjusted  5. Patient will be able to grocery shop for 20 minutes without aggravating pain. [] Progressing: [] Met: [] Not Met: [] Adjusted         ASSESSMENT:  See eval    Treatment/Activity Tolerance:  [x] Patient tolerated treatment well [] Patient limited by fatique  [] Patient limited by pain  [] Patient limited by other medical complications  [] Other:     Overall Progression Towards Functional goals/ Treatment Progress Update:  [] Patient is progressing as expected towards functional goals listed. [] Progression is slowed due to complexities/Impairments listed. [] Progression has been slowed due to co-morbidities.   [x] Plan just implemented, too soon to assess goals progression <30days   [] Goals require adjustment due to lack of progress  [] Patient is not progressing as expected and requires additional follow up with physician  [] Other    Prognosis for POC: [x] Good [] Fair  [] Poor    Patient requires continued skilled intervention: [x] Yes  [] No        PLAN: progress hook lying, sitting, standing strengthening, work hip ROM  [x] Continue per plan of care [] Alter current plan (see comments)  [] Plan of care initiated [] Hold pending MD visit [] Discharge    Electronically signed by: Ilana Duron, PT , T-C, [id number]      Note: If patient does not return for scheduled/recommended follow up visits, this note will serve as a discharge from care along with the most recent update on progress.

## 2022-11-17 ENCOUNTER — HOSPITAL ENCOUNTER (OUTPATIENT)
Dept: PHYSICAL THERAPY | Age: 77
Setting detail: THERAPIES SERIES
Discharge: HOME | End: 2022-11-17
Payer: MEDICARE

## 2022-11-17 PROCEDURE — 97112 NEUROMUSCULAR REEDUCATION: CPT

## 2022-11-17 PROCEDURE — 97140 MANUAL THERAPY 1/> REGIONS: CPT

## 2022-11-17 PROCEDURE — 97110 THERAPEUTIC EXERCISES: CPT

## 2022-11-17 NOTE — FLOWSHEET NOTE
[facility] - Outpatient Rehabilitation and Therapy, [address] [place] [address]  Phone: [phone]   Fax:     [phone]      Physical Therapy Treatment Note/ Progress Report:     Date:  2022    Patient Name:  Faith Bradshaw    :  1945  MRN: [id number]    Pertinent Medical History:     Referring Provider:   Dr. Aga Mcnamara                                    Evaluation Date: 11/10/2022                                               Medical Diagnosis:  Low back pain, unspecified [M54.50]  Other chronic pain [G89.29]     Treatment Diagnosis:      ICD-10-CM     1. Decreased activities of daily living (ADL)  Z78.9                                      Insurance information: [employer] [place]    Plan of care signed (Y/N): Y    Date of Patient follow up with Physician:      Progress Report: []  Yes  [x]  No     Date Range for reporting period:  Beginnin/10/22  Ending:      Progress report due (10 Rx/or 30 days whichever is less):     Recertification due (POC duration/ or 90 days whichever is less):     Visit # POC/Insurance Allowable Auth Needed   3 12 []Yes   [x]No     Latex Allergy:  [x]NO      []YES  Preferred Language for Healthcare:   [x]English       []other:  Functional Scale: FOTO Lumbar = 45    Pain level:  6-8/10 lumbar    History of Injury: Patient stated she was referred to PT for pain. Pt stated \"since Covid hit I really haven't done anything. \"  Pt has B lumbar pain. Pt denied numbness/tingling. Pt stated she has limits in standing tolerance to 20 minutes when cooking. Pt has trouble walking \"because of the knees. \"  Pt stated sitting is comfortable, lying is comfortable. Pt stated she has bone on bone problems with her knees.     SUBJECTIVE:  See eval  22 \"My Baker's cyst is just killing me today,\" has not \"noticed it too badly\" regarding back pain; performing HEP once/day  22 pt stated she started doing more exercises (her HEP) and her L knee is bothering her. Back \"doesn't hurt as long as I'm sitting or laying down\".   Pain is greatest at the L anterior thigh    OBJECTIVE:  Observation:   Posture: flexed, forward shoulders, decreased lumbar lordosis  Functional Mobility/Transfers: I, guarded rolling (fear of falling off of treatment plinth)   Palpation: TTP B lumbar paraspinals  Gait: (include devices/WB status) slow and guarded, decreased B step length and trunk rotation     Test measurements:    ROM LEFT RIGHT   HIP flex/ABD/ER [facility]   Hip IR, EXT limited limited   Knee  [facility]   Ankle [facility] WFL   Trunk rotation [facility] WFL   Trunk SB WFL WFL   Trunk flexion WFL     Trunk Ext Decreased 75%, no pain     Quadrant (-) (-)   Strength  LEFT RIGHT   HIP Flexors 5/5 5/5   Hip Adductors 4/5 4/5   HIP Abductors 4+/5 4+/5   HIP Ext 3+/5 3+/5   Hip ER       Knee EXT (quad) 5/5 5/5   Knee Flex (HS) 5/5 5/5   Ankle DF 5/5 5/5   Ankle PF 5/5 5/5   Ankle Inv 5/5 5/5   Ankle EV 5/5 5/5       RESTRICTIONS/PRECAUTIONS: DM, B shoulder surgery, R knee surgery    Exercises/Interventions:     Therapeutic Ex (65299)   Min: 15 Reps/Resistance Notes/CUES   Nu Step 5', seat 6, arm 8    Stretch gastroc incline 2x30\"    Stretch hamstring  Sitting   2x30\" L/R Hip L AAROM   Flexion, IR/ER, ABD 10x each L/R    L clam shell 10x, 3\" hold              Manual Intervention (92666)  Min: 15     STM Lumbar B STM L buttock/ant thigh IASTM 15'                        NMR re-education (52370)  Min: 15  CUES NEEDED   T-slide  Rows  Sitting trunk flexion   Green, 2x10  Green, 2x10    Hook lying  March  LTR stretch   10x  1x30\" L/R Reviewed as pt had questions about how to perform at home   Piriformis stretch 1x30\" L/R    Therapeutic Activity (88991)  Min:               Modalities  Min:     IFC with      CP after exercises     MH after exercises            Other Therapeutic Activities: Pt was educated on PT POC, Diagnosis, Prognosis, pathomechanics as well as frequency and duration of scheduling future physical therapy appointments. Time was also taken on this day to answer all patient questions and participation in PT. Reviewed appointment policy in detail with patient and patient verbalized understanding. Home Exercise Program: Patient was instructed in the following for HEP:       Access Code: [email or web address]  URL: [email or web address]  Date: 11/10/2022  Prepared by: Lenin Bench     Exercises  Hooklying Single Knee to Chest Stretch - 1 x daily - 7 x weekly - 1 reps - 30 hold  Supine Lower Trunk Rotation - 1 x daily - 7 x weekly - 10 reps  Bent Knee Fallouts - 1 x daily - 7 x weekly - 10 reps  Supine Hip Adduction Isometric with Ball - 1 x daily - 7 x weekly - 10 reps - 5 hold  Hooklying Single Leg March - 1 x daily - 7 x weekly - 10 reps    Therapeutic Exercise and NMR EXR  [] (23380) Provided verbal/tactile cueing for activities related to strengthening, flexibility, endurance, ROM for improvements in LE, proximal hip, and core control with self care, mobility, lifting, ambulation.  [] (19417) Provided verbal/tactile cueing for activities related to improving balance, coordination, kinesthetic sense, posture, motor skill, proprioception  to assist with LE, proximal hip, and core control in self care, mobility, lifting, ambulation and eccentric single leg control.      NMR and Therapeutic Activities:    [] (74727 or 12174) Provided verbal/tactile cueing for activities related to improving balance, coordination, kinesthetic sense, posture, motor skill, proprioception and motor activation to allow for proper function of core, proximal hip and LE with self care and ADLs and functional mobility.   [] (39283) Gait Re-education- Provided training and instruction to the patient for proper LE, core and proximal hip recruitment and positioning and eccentric body weight control with ambulation re-education including up and down stairs     Home Exercise Program: [x] (30932) Reviewed/Progressed HEP activities related to strengthening, flexibility, endurance, ROM of core, proximal hip and LE for functional self-care, mobility, lifting and ambulation/stair navigation   [] (05445)Reviewed/Progressed HEP activities related to improving balance, coordination, kinesthetic sense, posture, motor skill, proprioception of core, proximal hip and LE for self care, mobility, lifting, and ambulation/stair navigation      Manual Treatments:  PROM / STM / Oscillations-Mobs:  G-I, II, III, IV (PA's, Inf., Post.)  [] (75439) Provided manual therapy to mobilize LE, proximal hip and/or LS spine soft tissue/joints for the purpose of modulating pain, promoting relaxation,  increasing ROM, reducing/eliminating soft tissue swelling/inflammation/restriction, improving soft tissue extensibility and allowing for proper ROM for normal function with self care, mobility, lifting and ambulation. Charges:  Timed Code Treatment Minutes: 20   Total Treatment Minutes: 40      [x] EVAL (LOW) 27510 (typically 20 minutes face-to-face)  [] EVAL (MOD) 86014 (typically 30 minutes face-to-face)  [] EVAL (HIGH) 92757 (typically 45 minutes face-to-face)  [] RE-EVAL     [x] GV(91107) x     [] Dry needle 1 or 2 Muscles (39646)  [] NMR (22755) x     [] Dry needle 3+ Muscles (07631)  [] Manual (58338) x     [] Ultrasound (63073) x  [] TA (25851) x     [] Mech Traction (53808)  [] ES(attended) (93081)     [] ES (un) (76394):   [] Vasopump (02235) [] Ionto (86963)   [] Other:    GOALS:  Patient stated goal: \"walk more than room to room, love to go in to a store\"; pt likes to dance  [] Progressing: [] Met: [] Not Met: [] Adjusted     Therapist goals for Patient:   Short Term Goals: To be achieved in: 2 weeks  1. Independent in HEP and progression per patient tolerance, in order to prevent re-injury. [] Progressing: [] Met: [] Not Met: [] Adjusted  2.  Patient will have a decrease in pain to facilitate improvement in movement, function, and ADLs as indicated by Functional Deficits. [] Progressing: [] Met: [] Not Met: [] Adjusted     Long Term Goals: To be achieved in: 6 weeks  1. FOTO Lumbar Spine will score 52 to assist with reaching prior level of function. [] Progressing: [] Met: [] Not Met: [] Adjusted  2. Patient will demonstrate increased trunk AROM to full flexion to allow for proper joint functioning as indicated by patients Functional Deficits. [] Progressing: [] Met: [] Not Met: [] Adjusted  3. Patient will demonstrate an increase in Strength to good proximal hip strength and control, within 5lb HHD in LE to allow for proper functional mobility as indicated by patients Functional Deficits. [] Progressing: [] Met: [] Not Met: [] Adjusted  4. Patient will return to washing dishes for 20 mintues without increased symptoms or restriction. [] Progressing: [] Met: [] Not Met: [] Adjusted  5. Patient will be able to grocery shop for 20 minutes without aggravating pain. [] Progressing: [] Met: [] Not Met: [] Adjusted         ASSESSMENT:  See eval    Treatment/Activity Tolerance:  [x] Patient tolerated treatment well [] Patient limited by fatique  [] Patient limited by pain  [] Patient limited by other medical complications  [] Other:     Overall Progression Towards Functional goals/ Treatment Progress Update:  [] Patient is progressing as expected towards functional goals listed. [] Progression is slowed due to complexities/Impairments listed. [] Progression has been slowed due to co-morbidities.   [x] Plan just implemented, too soon to assess goals progression <30days   [] Goals require adjustment due to lack of progress  [] Patient is not progressing as expected and requires additional follow up with physician  [] Other    Prognosis for POC: [x] Good [] Fair  [] Poor    Patient requires continued skilled intervention: [x] Yes  [] No        PLAN: progress hook lying, sitting, standing strengthening, work hip ROM  [x] Continue per plan of care [] Alter current plan (see comments)  [] Plan of care initiated [] Hold pending MD visit [] Discharge    Electronically signed by: Lucía Jacobsen PT , OMT-C, [id number]      Note: If patient does not return for scheduled/recommended follow up visits, this note will serve as a discharge from care along with the most recent update on progress.

## 2022-11-28 ENCOUNTER — HOSPITAL ENCOUNTER (OUTPATIENT)
Dept: PHYSICAL THERAPY | Age: 77
Setting detail: THERAPIES SERIES
Discharge: HOME | End: 2022-11-28
Payer: MEDICARE

## 2022-11-28 PROCEDURE — 97140 MANUAL THERAPY 1/> REGIONS: CPT

## 2022-11-28 PROCEDURE — 97112 NEUROMUSCULAR REEDUCATION: CPT

## 2022-11-28 PROCEDURE — 97110 THERAPEUTIC EXERCISES: CPT

## 2022-11-28 NOTE — FLOWSHEET NOTE
[facility] - Outpatient Rehabilitation and Therapy, [address] [address]  Phone: [phone]   Fax:     [phone]      Physical Therapy Treatment Note/ Progress Report:     Date:  2022    Patient Name:  Ivone Stuart    :  1945  MRN: [id number]    Pertinent Medical History:     Referring Provider:   Dr. Astrid Bazan                                    Evaluation Date: 11/10/2022                                               Medical Diagnosis:  Low back pain, unspecified [M54.50]  Other chronic pain [G89.29]     Treatment Diagnosis:      ICD-10-CM     1. Decreased activities of daily living (ADL)  Z78.9                                      Insurance information: [employer] [place]    Plan of care signed (Y/N): Y    Date of Patient follow up with Physician:      Progress Report: []  Yes  [x]  No     Date Range for reporting period:  Beginnin/10/22  Ending:      Progress report due (10 Rx/or 30 days whichever is less):     Recertification due (POC duration/ or 90 days whichever is less):     Visit # POC/Insurance Allowable Auth Needed   4 12 []Yes   [x]No     Latex Allergy:  [x]NO      []YES  Preferred Language for Healthcare:   [x]English       []other:  Functional Scale: FOTO Lumbar = 45;   FOTO 54    Pain level:  6-8/10 lumbar    History of Injury: Patient stated she was referred to PT for pain. Pt stated \"since Covid hit I really haven't done anything. \"  Pt has B lumbar pain. Pt denied numbness/tingling. Pt stated she has limits in standing tolerance to 20 minutes when cooking. Pt has trouble walking \"because of the knees. \"  Pt stated sitting is comfortable, lying is comfortable. Pt stated she has bone on bone problems with her knees.     SUBJECTIVE:  See eval  22 \"My Baker's cyst is just killing me today,\" has not \"noticed it too badly\" regarding back pain; performing HEP once/day  22 pt stated she started doing more exercises (her HEP) and her L knee is bothering her. Back \"doesn't hurt as long as I'm sitting or laying down\". Pain is greatest at the L anterior thigh  11/28:  Initially felt a little better after last session    OBJECTIVE:  Observation:   Posture: flexed, forward shoulders, decreased lumbar lordosis  Functional Mobility/Transfers: I, guarded rolling (fear of falling off of treatment plinth)   Palpation: TTP B lumbar paraspinals  Gait: (include devices/WB status) slow and guarded, decreased B step length and trunk rotation     Test measurements:    ROM LEFT RIGHT   HIP flex/ABD/ER [facility]   Hip IR, EXT limited limited   Knee  [facility]   Ankle [facility] WFL   Trunk rotation [facility] WFL   Trunk SB WFL WFL   Trunk flexion WFL     Trunk Ext Decreased 75%, no pain     Quadrant (-) (-)   Strength  LEFT RIGHT   HIP Flexors 5/5 5/5   Hip Adductors 4/5 4/5   HIP Abductors 4+/5 4+/5   HIP Ext 3+/5 3+/5   Hip ER       Knee EXT (quad) 5/5 5/5   Knee Flex (HS) 5/5 5/5   Ankle DF 5/5 5/5   Ankle PF 5/5 5/5   Ankle Inv 5/5 5/5   Ankle EV 5/5 5/5       RESTRICTIONS/PRECAUTIONS: DM, B shoulder surgery, R knee surgery    Exercises/Interventions:     Therapeutic Ex (47375)   Min: 15 Reps/Resistance Notes/CUES   Nu Step 5', seat 6, arm 8    Stretch gastroc incline 2x30\"    Stretch hamstring  Sitting   2x30\" L/R Hip L AAROM   Flexion, IR/ER, ABD 10x each L/R    L clam shell 10x, 3\" hold    Standing hip          Manual Intervention (45050)  Min: 15     STM Lumbar B STM L buttock/ant thigh IASTM 15'                        NMR re-education (94497)  Min: 15  CUES NEEDED   T-slide  Rows  Sitting trunk flexion   Green, 2x10  Green, 2x10    Hook lying  March  LTR stretch   10x    HEP  HEP   Piriformis stretch 1x30\" L/R    Therapeutic Activity (78966)  Min:               Modalities  Min:     IFC with      CP after exercises     MH after exercises            Other Therapeutic Activities: Pt was educated on PT POC, Diagnosis, Prognosis, pathomechanics as well as frequency and duration of scheduling future physical therapy appointments. Time was also taken on this day to answer all patient questions and participation in PT. Reviewed appointment policy in detail with patient and patient verbalized understanding. Home Exercise Program: Patient was instructed in the following for HEP:       Access Code: [email or web address]  URL: [employer]/  Date: 11/10/2022  Prepared by: Sharon Lindo     Exercises  Hooklying Single Knee to Chest Stretch - 1 x daily - 7 x weekly - 1 reps - 30 hold  Supine Lower Trunk Rotation - 1 x daily - 7 x weekly - 10 reps  Bent Knee Fallouts - 1 x daily - 7 x weekly - 10 reps  Supine Hip Adduction Isometric with Ball - 1 x daily - 7 x weekly - 10 reps - 5 hold  Hooklying Single Leg March - 1 x daily - 7 x weekly - 10 reps    Therapeutic Exercise and NMR EXR  [] (19425) Provided verbal/tactile cueing for activities related to strengthening, flexibility, endurance, ROM for improvements in LE, proximal hip, and core control with self care, mobility, lifting, ambulation.  [] (73765) Provided verbal/tactile cueing for activities related to improving balance, coordination, kinesthetic sense, posture, motor skill, proprioception  to assist with LE, proximal hip, and core control in self care, mobility, lifting, ambulation and eccentric single leg control.      NMR and Therapeutic Activities:    [] (75229 or 81138) Provided verbal/tactile cueing for activities related to improving balance, coordination, kinesthetic sense, posture, motor skill, proprioception and motor activation to allow for proper function of core, proximal hip and LE with self care and ADLs and functional mobility.   [] (31309) Gait Re-education- Provided training and instruction to the patient for proper LE, core and proximal hip recruitment and positioning and eccentric body weight control with ambulation re-education including up and down stairs     Home Exercise Program:    [x] (77559) Reviewed/Progressed HEP activities related to strengthening, flexibility, endurance, ROM of core, proximal hip and LE for functional self-care, mobility, lifting and ambulation/stair navigation   [] (57083)Reviewed/Progressed HEP activities related to improving balance, coordination, kinesthetic sense, posture, motor skill, proprioception of core, proximal hip and LE for self care, mobility, lifting, and ambulation/stair navigation      Manual Treatments:  PROM / STM / Oscillations-Mobs:  G-I, II, III, IV (PA's, Inf., Post.)  [] (51623) Provided manual therapy to mobilize LE, proximal hip and/or LS spine soft tissue/joints for the purpose of modulating pain, promoting relaxation,  increasing ROM, reducing/eliminating soft tissue swelling/inflammation/restriction, improving soft tissue extensibility and allowing for proper ROM for normal function with self care, mobility, lifting and ambulation. Charges:  Timed Code Treatment Minutes: 45   Total Treatment Minutes: 45      [] EVAL (LOW) 57587 (typically 20 minutes face-to-face)  [] EVAL (MOD) 63829 (typically 30 minutes face-to-face)  [] EVAL (HIGH) 61562 (typically 45 minutes face-to-face)  [] RE-EVAL     [x] BL(22686) x     [] Dry needle 1 or 2 Muscles (63572)  [x] NMR (47613) x     [] Dry needle 3+ Muscles (15598)  [x] Manual (35092) x     [] Ultrasound (96940) x  [] TA (20134) x     [] Mech Traction (09740)  [] ES(attended) (01654)     [] ES (un) (74767):   [] Vasopump (22601) [] Ionto (10620)   [] Other:    GOALS:  Patient stated goal: \"walk more than room to room, love to go in to a store\"; pt likes to dance  [] Progressing: [] Met: [] Not Met: [] Adjusted     Therapist goals for Patient:   Short Term Goals: To be achieved in: 2 weeks  1. Independent in HEP and progression per patient tolerance, in order to prevent re-injury. [] Progressing: [] Met: [] Not Met: [] Adjusted  2.  Patient will have a decrease in pain to facilitate improvement in movement, function, and ADLs as indicated by Functional Deficits. [] Progressing: [] Met: [] Not Met: [] Adjusted     Long Term Goals: To be achieved in: 6 weeks  1. FOTO Lumbar Spine will score 52 to assist with reaching prior level of function. [] Progressing: [] Met: [] Not Met: [] Adjusted  2. Patient will demonstrate increased trunk AROM to full flexion to allow for proper joint functioning as indicated by patients Functional Deficits. [] Progressing: [] Met: [] Not Met: [] Adjusted  3. Patient will demonstrate an increase in Strength to good proximal hip strength and control, within 5lb HHD in LE to allow for proper functional mobility as indicated by patients Functional Deficits. [] Progressing: [] Met: [] Not Met: [] Adjusted  4. Patient will return to washing dishes for 20 mintues without increased symptoms or restriction. [] Progressing: [] Met: [] Not Met: [] Adjusted  5. Patient will be able to grocery shop for 20 minutes without aggravating pain. [] Progressing: [] Met: [] Not Met: [] Adjusted         ASSESSMENT:  See eval    Treatment/Activity Tolerance:  [x] Patient tolerated treatment well [] Patient limited by fatique  [] Patient limited by pain  [] Patient limited by other medical complications  [] Other:   11/28: Reviewed body mechanics for lifting and picking things up from floor. Issued Proper Body Mechanics packet. Cues for exercises and technique    Overall Progression Towards Functional goals/ Treatment Progress Update:  [] Patient is progressing as expected towards functional goals listed. [] Progression is slowed due to complexities/Impairments listed. [] Progression has been slowed due to co-morbidities.   [x] Plan just implemented, too soon to assess goals progression <30days   [] Goals require adjustment due to lack of progress  [] Patient is not progressing as expected and requires additional follow up with physician  [] Other    Prognosis for POC: [x] Good [] Fair  [] Poor    Patient requires continued skilled intervention: [x] Yes  [] No        PLAN: progress hook lying, sitting, standing strengthening, work hip ROM  [x] Continue per plan of care [] Alter current plan (see comments)  [] Plan of care initiated [] Hold pending MD visit [] Discharge    Electronically signed by: Tasha Abraham, PTA [id number]      Note: If patient does not return for scheduled/recommended follow up visits, this note will serve as a discharge from care along with the most recent update on progress.

## 2022-11-30 ENCOUNTER — HOSPITAL ENCOUNTER (OUTPATIENT)
Dept: PHYSICAL THERAPY | Age: 77
Setting detail: THERAPIES SERIES
Discharge: HOME | End: 2022-11-30
Payer: MEDICARE

## 2022-11-30 PROCEDURE — 97140 MANUAL THERAPY 1/> REGIONS: CPT

## 2022-11-30 PROCEDURE — 97112 NEUROMUSCULAR REEDUCATION: CPT

## 2022-11-30 PROCEDURE — 97110 THERAPEUTIC EXERCISES: CPT

## 2022-11-30 NOTE — FLOWSHEET NOTE
[facility] - Outpatient Rehabilitation and Therapy, [address] [address] [address]  Phone: [phone]   Fax:     [phone]      Physical Therapy Treatment Note/ Progress Report:     Date:  2022    Patient Name:  Soraya Abbott    :  1945  MRN: [id number]    Pertinent Medical History:     Referring Provider:   Dr. Zainab Zhao                                    Evaluation Date: 11/10/2022                                               Medical Diagnosis:  Low back pain, unspecified [M54.50]  Other chronic pain [G89.29]     Treatment Diagnosis:      ICD-10-CM     1. Decreased activities of daily living (ADL)  Z78.9                                      Insurance information: [employer] [place]    Plan of care signed (Y/N): Y    Date of Patient follow up with Physician:      Progress Report: []  Yes  [x]  No     Date Range for reporting period:  Beginnin/10/22  Ending:      Progress report due (10 Rx/or 30 days whichever is less):     Recertification due (POC duration/ or 90 days whichever is less):     Visit # POC/Insurance Allowable Auth Needed   5 12 []Yes   [x]No     Latex Allergy:  [x]NO      []YES  Preferred Language for Healthcare:   [x]English       []other:  Functional Scale: FOTO Lumbar = 45;   FOTO 54    Pain level:  6-8/10 lumbar    History of Injury: Patient stated she was referred to PT for pain. Pt stated \"since Covid hit I really haven't done anything. \"  Pt has B lumbar pain. Pt denied numbness/tingling. Pt stated she has limits in standing tolerance to 20 minutes when cooking. Pt has trouble walking \"because of the knees. \"  Pt stated sitting is comfortable, lying is comfortable. Pt stated she has bone on bone problems with her knees.     SUBJECTIVE:  See eval  22 \"My Baker's cyst is just killing me today,\" has not \"noticed it too badly\" regarding back pain; performing HEP once/day  22 pt stated she started doing more exercises (her HEP) and her L knee is bothering her. Back \"doesn't hurt as long as I'm sitting or laying down\". Pain is greatest at the L anterior thigh  11/28: Initially felt a little better after last session  11/30: Had a horrible night and horrible morning.  Did more than she normally does yesterday    OBJECTIVE:  Observation:   Posture: flexed, forward shoulders, decreased lumbar lordosis  Functional Mobility/Transfers: I, guarded rolling (fear of falling off of treatment plinth)   Palpation: TTP B lumbar paraspinals  Gait: (include devices/WB status) slow and guarded, decreased B step length and trunk rotation     Test measurements:    ROM LEFT RIGHT   HIP flex/ABD/ER [facility]   Hip IR, EXT limited limited   Knee  [facility]   Ankle [facility] WFL   Trunk rotation [facility] WFL   Trunk SB WFL WFL   Trunk flexion WFL     Trunk Ext Decreased 75%, no pain     Quadrant (-) (-)   Strength  LEFT RIGHT   HIP Flexors 5/5 5/5   Hip Adductors 4/5 4/5   HIP Abductors 4+/5 4+/5   HIP Ext 3+/5 3+/5   Hip ER       Knee EXT (quad) 5/5 5/5   Knee Flex (HS) 5/5 5/5   Ankle DF 5/5 5/5   Ankle PF 5/5 5/5   Ankle Inv 5/5 5/5   Ankle EV 5/5 5/5       RESTRICTIONS/PRECAUTIONS: DM, B shoulder surgery, R knee surgery    Exercises/Interventions:     Therapeutic Ex (60785)   Min: 15 Reps/Resistance Notes/CUES   Nu Step 5', seat 6, arm 8    Stretch gastroc incline 3x30\"    Stretch hamstring  Sitting   2x30\" L/R Hip L AAROM   Flexion, IR/ER, ABD 10x each L/R    L clam shell 10x, 3\" hold    Standing hip  As able        Manual Intervention (09083)  Min: 15     STM Lumbar B STM L buttock/ant thigh IASTM 15'                        NMR re-education (30593)  Min: 15  CUES NEEDED   T-slide  Rows  lats  Sitting trunk flexion   Green, 2x10  Green, 2x10  Green, 2x10    Hook lying  March  LTR stretch     HEP  HEP   Piriformis stretch 1x30\" L/R    Therapeutic Activity (40964)  Min:               Modalities  Min:     IFC with      CP after exercises     MH after exercises            Other Therapeutic Activities: Pt was educated on PT POC, Diagnosis, Prognosis, pathomechanics as well as frequency and duration of scheduling future physical therapy appointments. Time was also taken on this day to answer all patient questions and participation in PT. Reviewed appointment policy in detail with patient and patient verbalized understanding. Home Exercise Program: Patient was instructed in the following for HEP:       Access Code: [email or web address]  URL: [employer]/  Date: 11/10/2022  Prepared by: Oval Bhavesh     Exercises  Hooklying Single Knee to Chest Stretch - 1 x daily - 7 x weekly - 1 reps - 30 hold  Supine Lower Trunk Rotation - 1 x daily - 7 x weekly - 10 reps  Bent Knee Fallouts - 1 x daily - 7 x weekly - 10 reps  Supine Hip Adduction Isometric with Ball - 1 x daily - 7 x weekly - 10 reps - 5 hold  Hooklying Single Leg March - 1 x daily - 7 x weekly - 10 reps    Therapeutic Exercise and NMR EXR  [] (32446) Provided verbal/tactile cueing for activities related to strengthening, flexibility, endurance, ROM for improvements in LE, proximal hip, and core control with self care, mobility, lifting, ambulation.  [] (29108) Provided verbal/tactile cueing for activities related to improving balance, coordination, kinesthetic sense, posture, motor skill, proprioception  to assist with LE, proximal hip, and core control in self care, mobility, lifting, ambulation and eccentric single leg control.      NMR and Therapeutic Activities:    [] (29426 or 00423) Provided verbal/tactile cueing for activities related to improving balance, coordination, kinesthetic sense, posture, motor skill, proprioception and motor activation to allow for proper function of core, proximal hip and LE with self care and ADLs and functional mobility.   [] (62028) Gait Re-education- Provided training and instruction to the patient for proper LE, core and proximal hip recruitment and positioning and eccentric body weight control with ambulation re-education including up and down stairs     Home Exercise Program:    [x] (59528) Reviewed/Progressed HEP activities related to strengthening, flexibility, endurance, ROM of core, proximal hip and LE for functional self-care, mobility, lifting and ambulation/stair navigation   [] (02783)Reviewed/Progressed HEP activities related to improving balance, coordination, kinesthetic sense, posture, motor skill, proprioception of core, proximal hip and LE for self care, mobility, lifting, and ambulation/stair navigation      Manual Treatments:  PROM / STM / Oscillations-Mobs:  G-I, II, III, IV (PA's, Inf., Post.)  [] (03041) Provided manual therapy to mobilize LE, proximal hip and/or LS spine soft tissue/joints for the purpose of modulating pain, promoting relaxation,  increasing ROM, reducing/eliminating soft tissue swelling/inflammation/restriction, improving soft tissue extensibility and allowing for proper ROM for normal function with self care, mobility, lifting and ambulation. Charges:  Timed Code Treatment Minutes: 45   Total Treatment Minutes: 45      [] EVAL (LOW) 04334 (typically 20 minutes face-to-face)  [] EVAL (MOD) 30971 (typically 30 minutes face-to-face)  [] EVAL (HIGH) 05466 (typically 45 minutes face-to-face)  [] RE-EVAL     [x] YR(31829) x     [] Dry needle 1 or 2 Muscles (76690)  [x] NMR (04353) x     [] Dry needle 3+ Muscles (26494)  [x] Manual (89685) x     [] Ultrasound (06225) x  [] TA (64640) x     [] Mech Traction (20853)  [] ES(attended) (30119)     [] ES (un) (42134):   [] Vasopump (13674) [] Ionto (76826)   [] Other:    GOALS:  Patient stated goal: \"walk more than room to room, love to go in to a store\"; pt likes to dance  [] Progressing: [] Met: [] Not Met: [] Adjusted     Therapist goals for Patient:   Short Term Goals: To be achieved in: 2 weeks  1.  Independent in HEP and progression per patient tolerance, in order to prevent re-injury. [] Progressing: [] Met: [] Not Met: [] Adjusted  2. Patient will have a decrease in pain to facilitate improvement in movement, function, and ADLs as indicated by Functional Deficits. [] Progressing: [] Met: [] Not Met: [] Adjusted     Long Term Goals: To be achieved in: 6 weeks  1. FOTO Lumbar Spine will score 52 to assist with reaching prior level of function. [] Progressing: [] Met: [] Not Met: [] Adjusted  2. Patient will demonstrate increased trunk AROM to full flexion to allow for proper joint functioning as indicated by patients Functional Deficits. [] Progressing: [] Met: [] Not Met: [] Adjusted  3. Patient will demonstrate an increase in Strength to good proximal hip strength and control, within 5lb HHD in LE to allow for proper functional mobility as indicated by patients Functional Deficits. [] Progressing: [] Met: [] Not Met: [] Adjusted  4. Patient will return to washing dishes for 20 mintues without increased symptoms or restriction. [] Progressing: [] Met: [] Not Met: [] Adjusted  5. Patient will be able to grocery shop for 20 minutes without aggravating pain. [] Progressing: [] Met: [] Not Met: [] Adjusted         ASSESSMENT:  See eval    Treatment/Activity Tolerance:  [x] Patient tolerated treatment well [] Patient limited by fatique  [] Patient limited by pain  [] Patient limited by other medical complications  [] Other:   11/28: Reviewed body mechanics for lifting and picking things up from floor. Issued Proper Body Mechanics packet. Cues for exercises and technique  11/30: Patient reports legs being very fatigued today    Overall Progression Towards Functional goals/ Treatment Progress Update:  [] Patient is progressing as expected towards functional goals listed. [] Progression is slowed due to complexities/Impairments listed. [] Progression has been slowed due to co-morbidities.   [x] Plan just implemented, too soon to assess goals progression <30days [] Goals require adjustment due to lack of progress  [] Patient is not progressing as expected and requires additional follow up with physician  [] Other    Prognosis for POC: [x] Good [] Fair  [] Poor    Patient requires continued skilled intervention: [x] Yes  [] No        PLAN: progress hook lying, sitting, standing strengthening, work hip ROM  [x] Continue per plan of care [] Alter current plan (see comments)  [] Plan of care initiated [] Hold pending MD visit [] Discharge    Electronically signed by: Suzy Cervantes, PTA [id number]      Note: If patient does not return for scheduled/recommended follow up visits, this note will serve as a discharge from care along with the most recent update on progress.

## 2022-12-06 ENCOUNTER — HOSPITAL ENCOUNTER (OUTPATIENT)
Dept: PHYSICAL THERAPY | Age: 77
Setting detail: THERAPIES SERIES
Discharge: HOME | End: 2022-12-06
Payer: MEDICARE

## 2022-12-06 PROCEDURE — 97110 THERAPEUTIC EXERCISES: CPT

## 2022-12-06 PROCEDURE — 97140 MANUAL THERAPY 1/> REGIONS: CPT

## 2022-12-06 PROCEDURE — 97112 NEUROMUSCULAR REEDUCATION: CPT

## 2022-12-06 NOTE — FLOWSHEET NOTE
[facility] - Outpatient Rehabilitation and Therapy, [address] [address]  Phone: [phone]   Fax:     [phone]      Physical Therapy Treatment Note/ Progress Report:     Date:  2022    Patient Name:  Dandy Matias    :  1945  MRN: [id number]    Pertinent Medical History:     Referring Provider:   Dr. Moreno Bradley                                    Evaluation Date: 11/10/2022                                               Medical Diagnosis:  Low back pain, unspecified [M54.50]  Other chronic pain [G89.29]     Treatment Diagnosis:      ICD-10-CM     1. Decreased activities of daily living (ADL)  Z78.9                                      Insurance information: [employer] [place]    Plan of care signed (Y/N): Y    Date of Patient follow up with Physician:      Progress Report: []  Yes  [x]  No     Date Range for reporting period:  Beginnin/10/22  Ending:      Progress report due (10 Rx/or 30 days whichever is less):     Recertification due (POC duration/ or 90 days whichever is less):     Visit # POC/Insurance Allowable Auth Needed   6 12 []Yes   [x]No     Latex Allergy:  [x]NO      []YES  Preferred Language for Healthcare:   [x]English       []other:  Functional Scale: FOTO Lumbar = 45;   FOTO 54    Pain level:  6-8/10 lumbar    History of Injury: Patient stated she was referred to PT for pain. Pt stated \"since Covid hit I really haven't done anything. \"  Pt has B lumbar pain. Pt denied numbness/tingling. Pt stated she has limits in standing tolerance to 20 minutes when cooking. Pt has trouble walking \"because of the knees. \"  Pt stated sitting is comfortable, lying is comfortable. Pt stated she has bone on bone problems with her knees.     SUBJECTIVE:  See eval  22 \"My Baker's cyst is just killing me today,\" has not \"noticed it too badly\" regarding back pain; performing HEP once/day  22 pt stated she started doing more exercises (her HEP) and her L knee is bothering her. Back \"doesn't hurt as long as I'm sitting or laying down\". Pain is greatest at the L anterior thigh  11/28: Initially felt a little better after last session  11/30: Had a horrible night and horrible morning. Did more than she normally does yesterday  12/6: Hasn't been bad. Yesterday was really good. Decorated the [place] trees and did a few other things yesterday. Woke up fine this morning.      OBJECTIVE:  Observation:   Posture: flexed, forward shoulders, decreased lumbar lordosis  Functional Mobility/Transfers: I, guarded rolling (fear of falling off of treatment plinth)   Palpation: TTP B lumbar paraspinals  Gait: (include devices/WB status) slow and guarded, decreased B step length and trunk rotation     Test measurements:    ROM LEFT RIGHT   HIP flex/ABD/ER [facility]   Hip IR, EXT limited limited   Knee  [facility]   Ankle [facility] WFL   Trunk rotation [facility] WFL   Trunk SB WFL WFL   Trunk flexion WFL     Trunk Ext Decreased 75%, no pain     Quadrant (-) (-)   Strength  LEFT RIGHT   HIP Flexors 5/5 5/5   Hip Adductors 4/5 4/5   HIP Abductors 4+/5 4+/5   HIP Ext 3+/5 3+/5   Hip ER       Knee EXT (quad) 5/5 5/5   Knee Flex (HS) 5/5 5/5   Ankle DF 5/5 5/5   Ankle PF 5/5 5/5   Ankle Inv 5/5 5/5   Ankle EV 5/5 5/5       RESTRICTIONS/PRECAUTIONS: DM, B shoulder surgery, R knee surgery    Exercises/Interventions:     Therapeutic Ex (01966)   Min: 15 Reps/Resistance Notes/CUES   Nu Step 5', seat 6, arm 8    Stretch gastroc incline 3x30\"    Stretch hamstring  Sitting   2x30\" L/R Hip L AAROM   Flexion, IR/ER, ABD 10x each L/R    L clam shell 10x, 3\" hold    Standing hip flex                       abd X10 R/L  X10 R/L         Manual Intervention (71151)  Min: 15     STM Lumbar B STM L buttock/ant thigh IASTM 15'                        NMR re-education (80907)  Min: 15  CUES NEEDED   T-slide  Rows  lats  Sitting trunk flexion   Green, 2x10  Green, 2x10  Green, 2x10    Hook lying  March  LTR stretch     HEP  HEP   Piriformis stretch 1x30\" L/R    Therapeutic Activity (59132)  Min:               Modalities  Min:     IFC with      CP after exercises     MH after exercises            Other Therapeutic Activities: Pt was educated on PT POC, Diagnosis, Prognosis, pathomechanics as well as frequency and duration of scheduling future physical therapy appointments. Time was also taken on this day to answer all patient questions and participation in PT. Reviewed appointment policy in detail with patient and patient verbalized understanding. Home Exercise Program: Patient was instructed in the following for HEP:       Access Code: [email or web address]  URL: [email or web address]  Date: 11/10/2022  Prepared by: Shelby Fail     Exercises  Hooklying Single Knee to Chest Stretch - 1 x daily - 7 x weekly - 1 reps - 30 hold  Supine Lower Trunk Rotation - 1 x daily - 7 x weekly - 10 reps  Bent Knee Fallouts - 1 x daily - 7 x weekly - 10 reps  Supine Hip Adduction Isometric with Ball - 1 x daily - 7 x weekly - 10 reps - 5 hold  Hooklying Single Leg March - 1 x daily - 7 x weekly - 10 reps    Therapeutic Exercise and NMR EXR  [] (52251) Provided verbal/tactile cueing for activities related to strengthening, flexibility, endurance, ROM for improvements in LE, proximal hip, and core control with self care, mobility, lifting, ambulation.  [] (30660) Provided verbal/tactile cueing for activities related to improving balance, coordination, kinesthetic sense, posture, motor skill, proprioception  to assist with LE, proximal hip, and core control in self care, mobility, lifting, ambulation and eccentric single leg control.      NMR and Therapeutic Activities:    [] (62784 or 73394) Provided verbal/tactile cueing for activities related to improving balance, coordination, kinesthetic sense, posture, motor skill, proprioception and motor activation to allow for proper function of core, proximal hip and LE with self care and ADLs and functional mobility.   [] (15058) Gait Re-education- Provided training and instruction to the patient for proper LE, core and proximal hip recruitment and positioning and eccentric body weight control with ambulation re-education including up and down stairs     Home Exercise Program:    [x] (24354) Reviewed/Progressed HEP activities related to strengthening, flexibility, endurance, ROM of core, proximal hip and LE for functional self-care, mobility, lifting and ambulation/stair navigation   [] (66457)Reviewed/Progressed HEP activities related to improving balance, coordination, kinesthetic sense, posture, motor skill, proprioception of core, proximal hip and LE for self care, mobility, lifting, and ambulation/stair navigation      Manual Treatments:  PROM / STM / Oscillations-Mobs:  G-I, II, III, IV (PA's, Inf., Post.)  [] (73533) Provided manual therapy to mobilize LE, proximal hip and/or LS spine soft tissue/joints for the purpose of modulating pain, promoting relaxation,  increasing ROM, reducing/eliminating soft tissue swelling/inflammation/restriction, improving soft tissue extensibility and allowing for proper ROM for normal function with self care, mobility, lifting and ambulation.      Charges:  Timed Code Treatment Minutes: 45   Total Treatment Minutes: 45      [] EVAL (LOW) 79555 (typically 20 minutes face-to-face)  [] EVAL (MOD) 18562 (typically 30 minutes face-to-face)  [] EVAL (HIGH) 75816 (typically 45 minutes face-to-face)  [] RE-EVAL     [x] MO(00181) x     [] Dry needle 1 or 2 Muscles (02132)  [x] NMR (36741) x     [] Dry needle 3+ Muscles (36622)  [x] Manual (91614) x     [] Ultrasound (39492) x  [] TA (40893) x     [] Mech Traction (89400)  [] ES(attended) (80723)     [] ES (un) (37224):   [] Vasopump (38155) [] Ionto (51343)   [] Other:    GOALS:  Patient stated goal: \"walk more than room to room, love to go in to a store\"; pt likes to dance  [] Progressing: [] Met: [] Not Met: [] Adjusted     Therapist goals for Patient:   Short Term Goals: To be achieved in: 2 weeks  1. Independent in HEP and progression per patient tolerance, in order to prevent re-injury. [] Progressing: [] Met: [] Not Met: [] Adjusted  2. Patient will have a decrease in pain to facilitate improvement in movement, function, and ADLs as indicated by Functional Deficits. [] Progressing: [] Met: [] Not Met: [] Adjusted     Long Term Goals: To be achieved in: 6 weeks  1. FOTO Lumbar Spine will score 52 to assist with reaching prior level of function. [] Progressing: [] Met: [] Not Met: [] Adjusted  2. Patient will demonstrate increased trunk AROM to full flexion to allow for proper joint functioning as indicated by patients Functional Deficits. [] Progressing: [] Met: [] Not Met: [] Adjusted  3. Patient will demonstrate an increase in Strength to good proximal hip strength and control, within 5lb HHD in LE to allow for proper functional mobility as indicated by patients Functional Deficits. [] Progressing: [] Met: [] Not Met: [] Adjusted  4. Patient will return to washing dishes for 20 mintues without increased symptoms or restriction. [] Progressing: [] Met: [] Not Met: [] Adjusted  5. Patient will be able to grocery shop for 20 minutes without aggravating pain. [] Progressing: [] Met: [] Not Met: [] Adjusted         ASSESSMENT:  See eval    Treatment/Activity Tolerance:  [x] Patient tolerated treatment well [] Patient limited by fatique  [] Patient limited by pain  [] Patient limited by other medical complications  [] Other:   11/28: Reviewed body mechanics for lifting and picking things up from floor. Issued Proper Body Mechanics packet. Cues for exercises and technique  11/30: Patient reports legs being very fatigued today    Overall Progression Towards Functional goals/ Treatment Progress Update:  [] Patient is progressing as expected towards functional goals listed.     [] Progression is slowed due to complexities/Impairments listed. [] Progression has been slowed due to co-morbidities. [x] Plan just implemented, too soon to assess goals progression <30days   [] Goals require adjustment due to lack of progress  [] Patient is not progressing as expected and requires additional follow up with physician  [] Other    Prognosis for POC: [x] Good [] Fair  [] Poor    Patient requires continued skilled intervention: [x] Yes  [] No        PLAN: progress hook lying, sitting, standing strengthening, work hip ROM  [x] Continue per plan of care [] Alter current plan (see comments)  [] Plan of care initiated [] Hold pending MD visit [] Discharge    Electronically signed by: Salazar Toussaint, PTA [id number]      Note: If patient does not return for scheduled/recommended follow up visits, this note will serve as a discharge from care along with the most recent update on progress.

## 2022-12-08 ENCOUNTER — HOSPITAL ENCOUNTER (OUTPATIENT)
Dept: PHYSICAL THERAPY | Age: 77
Setting detail: THERAPIES SERIES
Discharge: HOME | End: 2022-12-08
Payer: MEDICARE

## 2022-12-08 PROCEDURE — 97110 THERAPEUTIC EXERCISES: CPT

## 2022-12-08 PROCEDURE — 97140 MANUAL THERAPY 1/> REGIONS: CPT

## 2022-12-08 PROCEDURE — 97112 NEUROMUSCULAR REEDUCATION: CPT

## 2022-12-08 NOTE — FLOWSHEET NOTE
[facility] - Outpatient Rehabilitation and Therapy, [address] [place] [address]  Phone: [phone]   Fax:     [phone]      Physical Therapy Treatment Note/ Progress Report:     Date:  2022    Patient Name:  Belen De La Rosa    :  1945  MRN: [id number]    Pertinent Medical History:     Referring Provider:   Dr. Junnie Cogan                                    Evaluation Date: 11/10/2022                                               Medical Diagnosis:  Low back pain, unspecified [M54.50]  Other chronic pain [G89.29]     Treatment Diagnosis:      ICD-10-CM     1. Decreased activities of daily living (ADL)  Z78.9                                      Insurance information: [employer] [place]    Plan of care signed (Y/N): Y    Date of Patient follow up with Physician:      Progress Report: []  Yes  [x]  No     Date Range for reporting period:  Beginnin/10/22  Ending:      Progress report due (10 Rx/or 30 days whichever is less):     Recertification due (POC duration/ or 90 days whichever is less):     Visit # POC/Insurance Allowable Auth Needed   7 12 []Yes   [x]No     Latex Allergy:  [x]NO      []YES  Preferred Language for Healthcare:   [x]English       []other:  Functional Scale: FOTO Lumbar = 45;   FOTO 54    Pain level:  6-8/10 lumbar    History of Injury: Patient stated she was referred to PT for pain. Pt stated \"since Covid hit I really haven't done anything. \"  Pt has B lumbar pain. Pt denied numbness/tingling. Pt stated she has limits in standing tolerance to 20 minutes when cooking. Pt has trouble walking \"because of the knees. \"  Pt stated sitting is comfortable, lying is comfortable. Pt stated she has bone on bone problems with her knees.     SUBJECTIVE:  See eval  22 \"My Baker's cyst is just killing me today,\" has not \"noticed it too badly\" regarding back pain; performing HEP once/day  22 pt stated she started doing more exercises (her HEP) and her L knee is bothering her. Back \"doesn't hurt as long as I'm sitting or laying down\". Pain is greatest at the L anterior thigh  11/28: Initially felt a little better after last session  11/30: Had a horrible night and horrible morning. Did more than she normally does yesterday  12/6: Hasn't been bad. Yesterday was really good. Decorated the [place] trees and did a few other things yesterday. Woke up fine this morning. 12/8: Was walking up the steps and both her knees gave and landed on the next step. Now both knees are hurting. Doesn't feel the back/buttock pain unless she is standing more then 15 minutes.  If she sits down, it goes away    OBJECTIVE:  Observation:   Posture: flexed, forward shoulders, decreased lumbar lordosis  Functional Mobility/Transfers: I, guarded rolling (fear of falling off of treatment plinth)   Palpation: TTP B lumbar paraspinals  Gait: (include devices/WB status) slow and guarded, decreased B step length and trunk rotation     Test measurements:    ROM LEFT RIGHT   HIP flex/ABD/ER [facility]   Hip IR, EXT limited limited   Knee  [facility]   Ankle [facility] WFL   Trunk rotation [facility] WFL   Trunk SB WFL WFL   Trunk flexion WFL     Trunk Ext Decreased 75%, no pain     Quadrant (-) (-)   Strength  LEFT RIGHT   HIP Flexors 5/5 5/5   Hip Adductors 4/5 4/5   HIP Abductors 4+/5 4+/5   HIP Ext 3+/5 3+/5   Hip ER       Knee EXT (quad) 5/5 5/5   Knee Flex (HS) 5/5 5/5   Ankle DF 5/5 5/5   Ankle PF 5/5 5/5   Ankle Inv 5/5 5/5   Ankle EV 5/5 5/5       RESTRICTIONS/PRECAUTIONS: DM, B shoulder surgery, R knee surgery    Exercises/Interventions:     Therapeutic Ex (18182)   Min: 15 Reps/Resistance Notes/CUES   Nu Step 7', seat 6, arm 8    Stretch gastroc incline 3x30\"    Stretch hamstring  Sitting   2x30\" L/R Hip L AAROM   Flexion, IR/ER, ABD 10x each L/R    L clam shell 10x, 3\" hold    Standing hip flex                       abd X10 R/L  X10 R/L Manual Intervention (58615)  Min: 15     STM Lumbar B STM L buttock/ant thigh IASTM 15'                        NMR re-education (98392)  Min: 15  CUES NEEDED   T-slide  Rows  lats  Sitting trunk flexion   Green, 2x10  Green, 2x10  Green, 2x10    Hook lying  March  LTR stretch     HEP  HEP   Piriformis stretch 1x30\" L/R    Therapeutic Activity (97427)  Min:               Modalities  Min:     IFC with      CP after exercises     MH after exercises            Other Therapeutic Activities: Pt was educated on PT POC, Diagnosis, Prognosis, pathomechanics as well as frequency and duration of scheduling future physical therapy appointments. Time was also taken on this day to answer all patient questions and participation in PT. Reviewed appointment policy in detail with patient and patient verbalized understanding. Home Exercise Program: Patient was instructed in the following for HEP:       Access Code: [email or web address]  URL: [email or web address]  Date: 11/10/2022  Prepared by: Evan Navarro     Exercises  Hooklying Single Knee to Chest Stretch - 1 x daily - 7 x weekly - 1 reps - 30 hold  Supine Lower Trunk Rotation - 1 x daily - 7 x weekly - 10 reps  Bent Knee Fallouts - 1 x daily - 7 x weekly - 10 reps  Supine Hip Adduction Isometric with Ball - 1 x daily - 7 x weekly - 10 reps - 5 hold  Hooklying Single Leg March - 1 x daily - 7 x weekly - 10 reps    Therapeutic Exercise and NMR EXR  [] (08623) Provided verbal/tactile cueing for activities related to strengthening, flexibility, endurance, ROM for improvements in LE, proximal hip, and core control with self care, mobility, lifting, ambulation.  [] (15924) Provided verbal/tactile cueing for activities related to improving balance, coordination, kinesthetic sense, posture, motor skill, proprioception  to assist with LE, proximal hip, and core control in self care, mobility, lifting, ambulation and eccentric single leg control.      NMR and Therapeutic Activities:    [] (99055 or ) Provided verbal/tactile cueing for activities related to improving balance, coordination, kinesthetic sense, posture, motor skill, proprioception and motor activation to allow for proper function of core, proximal hip and LE with self care and ADLs and functional mobility.   [] (60334) Gait Re-education- Provided training and instruction to the patient for proper LE, core and proximal hip recruitment and positioning and eccentric body weight control with ambulation re-education including up and down stairs     Home Exercise Program:    [x] (25592) Reviewed/Progressed HEP activities related to strengthening, flexibility, endurance, ROM of core, proximal hip and LE for functional self-care, mobility, lifting and ambulation/stair navigation   [] (09390)Reviewed/Progressed HEP activities related to improving balance, coordination, kinesthetic sense, posture, motor skill, proprioception of core, proximal hip and LE for self care, mobility, lifting, and ambulation/stair navigation      Manual Treatments:  PROM / STM / Oscillations-Mobs:  G-I, II, III, IV (PA's, Inf., Post.)  [] (61429) Provided manual therapy to mobilize LE, proximal hip and/or LS spine soft tissue/joints for the purpose of modulating pain, promoting relaxation,  increasing ROM, reducing/eliminating soft tissue swelling/inflammation/restriction, improving soft tissue extensibility and allowing for proper ROM for normal function with self care, mobility, lifting and ambulation.      Charges:  Timed Code Treatment Minutes: 45   Total Treatment Minutes: 45      [] EVAL (LOW) 59456 (typically 20 minutes face-to-face)  [] EVAL (MOD) 37580 (typically 30 minutes face-to-face)  [] EVAL (HIGH) 25628 (typically 45 minutes face-to-face)  [] RE-EVAL     [x] RW(67073) x     [] Dry needle 1 or 2 Muscles (74832)  [x] NMR (16187) x     [] Dry needle 3+ Muscles (86533)  [x] Manual (05876) x     [] Ultrasound (05220) x  [] TA (59268) x     [] Mech Traction (42529)  [] ES(attended) (92715)     [] ES (un) (90971):   [] Vasopump (74002) [] Ionto (50718)   [] Other:    GOALS:  Patient stated goal: \"walk more than room to room, love to go in to a store\"; pt likes to dance  [] Progressing: [] Met: [] Not Met: [] Adjusted     Therapist goals for Patient:   Short Term Goals: To be achieved in: 2 weeks  1. Independent in HEP and progression per patient tolerance, in order to prevent re-injury. [] Progressing: [] Met: [] Not Met: [] Adjusted  2. Patient will have a decrease in pain to facilitate improvement in movement, function, and ADLs as indicated by Functional Deficits. [] Progressing: [] Met: [] Not Met: [] Adjusted     Long Term Goals: To be achieved in: 6 weeks  1. FOTO Lumbar Spine will score 52 to assist with reaching prior level of function. [] Progressing: [] Met: [] Not Met: [] Adjusted  2. Patient will demonstrate increased trunk AROM to full flexion to allow for proper joint functioning as indicated by patients Functional Deficits. [] Progressing: [] Met: [] Not Met: [] Adjusted  3. Patient will demonstrate an increase in Strength to good proximal hip strength and control, within 5lb HHD in LE to allow for proper functional mobility as indicated by patients Functional Deficits. [] Progressing: [] Met: [] Not Met: [] Adjusted  4. Patient will return to washing dishes for 20 mintues without increased symptoms or restriction. [] Progressing: [] Met: [] Not Met: [] Adjusted  5. Patient will be able to grocery shop for 20 minutes without aggravating pain. [] Progressing: [] Met: [] Not Met: [] Adjusted         ASSESSMENT:  See eval    Treatment/Activity Tolerance:  [x] Patient tolerated treatment well [] Patient limited by fatique  [] Patient limited by pain  [] Patient limited by other medical complications  [] Other:   11/28: Reviewed body mechanics for lifting and picking things up from floor. Issued Proper Body Mechanics packet.  Cues for exercises and technique  11/30: Patient reports legs being very fatigued today  12/8: Tolerates session \"ok. \" No overall changes with therapy thus far. Continues to have LBP after 15' of standing. Overall Progression Towards Functional goals/ Treatment Progress Update:  [] Patient is progressing as expected towards functional goals listed. [] Progression is slowed due to complexities/Impairments listed. [] Progression has been slowed due to co-morbidities. [x] Plan just implemented, too soon to assess goals progression <30days   [] Goals require adjustment due to lack of progress  [] Patient is not progressing as expected and requires additional follow up with physician  [] Other    Prognosis for POC: [x] Good [] Fair  [] Poor    Patient requires continued skilled intervention: [x] Yes  [] No        PLAN: progress hook lying, sitting, standing strengthening, work hip ROM  [x] Continue per plan of care [] Alter current plan (see comments)  [] Plan of care initiated [] Hold pending MD visit [] Discharge    Electronically signed by: Yamilet Mario, PTA [id number]      Note: If patient does not return for scheduled/recommended follow up visits, this note will serve as a discharge from care along with the most recent update on progress.

## 2022-12-14 ENCOUNTER — HOSPITAL ENCOUNTER (OUTPATIENT)
Dept: PHYSICAL THERAPY | Age: 77
Setting detail: THERAPIES SERIES
Discharge: HOME | End: 2022-12-14
Payer: MEDICARE

## 2022-12-14 PROCEDURE — 97140 MANUAL THERAPY 1/> REGIONS: CPT

## 2022-12-14 PROCEDURE — 97110 THERAPEUTIC EXERCISES: CPT

## 2022-12-14 PROCEDURE — 97112 NEUROMUSCULAR REEDUCATION: CPT

## 2022-12-14 NOTE — FLOWSHEET NOTE
[facility] - Outpatient Rehabilitation and Therapy, [address] [place] [address]  Phone: [phone]   Fax:     [phone]      Physical Therapy Treatment Note/ Progress Report:     Date:  2022    Patient Name:  Juanito Garcia    :  1945  MRN: [id number]    Pertinent Medical History:     Referring Provider:   Dr. Arik Borja                                    Evaluation Date: 11/10/2022                                               Medical Diagnosis:  Low back pain, unspecified [M54.50]  Other chronic pain [G89.29]     Treatment Diagnosis:      ICD-10-CM     1. Decreased activities of daily living (ADL)  Z78.9                                      Insurance information: [employer] [place]    Plan of care signed (Y/N): Y    Date of Patient follow up with Physician:      Progress Report: []  Yes  [x]  No     Date Range for reporting period:  Beginnin/10/22  Ending:      Progress report due (10 Rx/or 30 days whichever is less):     Recertification due (POC duration/ or 90 days whichever is less):     Visit # POC/Insurance Allowable Auth Needed   8 12 []Yes   [x]No     Latex Allergy:  [x]NO      []YES  Preferred Language for Healthcare:   [x]English       []other:  Functional Scale: FOTO Lumbar = 45;   FOTO 54    Pain level:  6-8/10 lumbar    History of Injury: Patient stated she was referred to PT for pain. Pt stated \"since Covid hit I really haven't done anything. \"  Pt has B lumbar pain. Pt denied numbness/tingling. Pt stated she has limits in standing tolerance to 20 minutes when cooking. Pt has trouble walking \"because of the knees. \"  Pt stated sitting is comfortable, lying is comfortable. Pt stated she has bone on bone problems with her knees.     SUBJECTIVE:  See eval  22 \"My Baker's cyst is just killing me today,\" has not \"noticed it too badly\" regarding back pain; performing HEP once/day  22 pt stated she started doing more exercises (her HEP) and her L knee is bothering her. Back \"doesn't hurt as long as I'm sitting or laying down\". Pain is greatest at the L anterior thigh  11/28: Initially felt a little better after last session  11/30: Had a horrible night and horrible morning. Did more than she normally does yesterday  12/6: Hasn't been bad. Yesterday was really good. Decorated the [place] trees and did a few other things yesterday. Woke up fine this morning. 12/8: Was walking up the steps and both her knees gave and landed on the next step. Now both knees are hurting. Doesn't feel the back/buttock pain unless she is standing more then 15 minutes. If she sits down, it goes away  12/14: Tired from entertaining, or it could be the weather.  Legs are stiff as hell    OBJECTIVE:  Observation:   Posture: flexed, forward shoulders, decreased lumbar lordosis  Functional Mobility/Transfers: I, guarded rolling (fear of falling off of treatment plinth)   Palpation: TTP B lumbar paraspinals  Gait: (include devices/WB status) slow and guarded, decreased B step length and trunk rotation     Test measurements:    ROM LEFT RIGHT   HIP flex/ABD/ER [facility]   Hip IR, EXT limited limited   Knee  [facility]   Ankle [facility] WFL   Trunk rotation [facility] WFL   Trunk SB WFL WFL   Trunk flexion WFL     Trunk Ext Decreased 75%, no pain     Quadrant (-) (-)   Strength  LEFT RIGHT   HIP Flexors 5/5 5/5   Hip Adductors 4/5 4/5   HIP Abductors 4+/5 4+/5   HIP Ext 3+/5 3+/5   Hip ER       Knee EXT (quad) 5/5 5/5   Knee Flex (HS) 5/5 5/5   Ankle DF 5/5 5/5   Ankle PF 5/5 5/5   Ankle Inv 5/5 5/5   Ankle EV 5/5 5/5       RESTRICTIONS/PRECAUTIONS: DM, B shoulder surgery, R knee surgery    Exercises/Interventions:     Therapeutic Ex (97708)   Min: 15 Reps/Resistance Notes/CUES   Nu Step 7', seat 6, arm 8    Stretch gastroc incline 3x30\"    Stretch hamstring  Sitting   2x30\" L/R Hip L AAROM   Flexion, IR/ER, ABD 10x each L/R    L clam shell 10x, 3\" hold    Standing hip flex                       abd 2X10 R/L  2X10 R/L         Manual Intervention (86139)  Min: 15     STM Lumbar B STM L buttock/ant thigh IASTM 15'                        NMR re-education (96477)  Min: 15  CUES NEEDED   T-slide  Rows  lats  Sitting trunk flexion   Green, 2x10  Green, 2x10  Green, 2x10    Hook lying  March  LTR stretch     HEP  HEP   Piriformis stretch 1x30\" L/R    Therapeutic Activity (29088)  Min:               Modalities  Min:     IFC with      CP after exercises     MH after exercises            Other Therapeutic Activities: Pt was educated on PT POC, Diagnosis, Prognosis, pathomechanics as well as frequency and duration of scheduling future physical therapy appointments. Time was also taken on this day to answer all patient questions and participation in PT. Reviewed appointment policy in detail with patient and patient verbalized understanding. Home Exercise Program: Patient was instructed in the following for HEP:       Access Code: [email or web address]  URL: [email or web address]  Date: 11/10/2022  Prepared by: Brea Severance     Exercises  Hooklying Single Knee to Chest Stretch - 1 x daily - 7 x weekly - 1 reps - 30 hold  Supine Lower Trunk Rotation - 1 x daily - 7 x weekly - 10 reps  Bent Knee Fallouts - 1 x daily - 7 x weekly - 10 reps  Supine Hip Adduction Isometric with Ball - 1 x daily - 7 x weekly - 10 reps - 5 hold  Hooklying Single Leg March - 1 x daily - 7 x weekly - 10 reps    Therapeutic Exercise and NMR EXR  [] (85665) Provided verbal/tactile cueing for activities related to strengthening, flexibility, endurance, ROM for improvements in LE, proximal hip, and core control with self care, mobility, lifting, ambulation.  [] (06091) Provided verbal/tactile cueing for activities related to improving balance, coordination, kinesthetic sense, posture, motor skill, proprioception  to assist with LE, proximal hip, and core control in self care, mobility, lifting, ambulation and eccentric single leg control. NMR and Therapeutic Activities:    [] (49989 or 78600) Provided verbal/tactile cueing for activities related to improving balance, coordination, kinesthetic sense, posture, motor skill, proprioception and motor activation to allow for proper function of core, proximal hip and LE with self care and ADLs and functional mobility.   [] (34955) Gait Re-education- Provided training and instruction to the patient for proper LE, core and proximal hip recruitment and positioning and eccentric body weight control with ambulation re-education including up and down stairs     Home Exercise Program:    [x] (38097) Reviewed/Progressed HEP activities related to strengthening, flexibility, endurance, ROM of core, proximal hip and LE for functional self-care, mobility, lifting and ambulation/stair navigation   [] (11035)Reviewed/Progressed HEP activities related to improving balance, coordination, kinesthetic sense, posture, motor skill, proprioception of core, proximal hip and LE for self care, mobility, lifting, and ambulation/stair navigation      Manual Treatments:  PROM / STM / Oscillations-Mobs:  G-I, II, III, IV (PA's, Inf., Post.)  [] (67825) Provided manual therapy to mobilize LE, proximal hip and/or LS spine soft tissue/joints for the purpose of modulating pain, promoting relaxation,  increasing ROM, reducing/eliminating soft tissue swelling/inflammation/restriction, improving soft tissue extensibility and allowing for proper ROM for normal function with self care, mobility, lifting and ambulation.      Charges:  Timed Code Treatment Minutes: 45   Total Treatment Minutes: 45      [] EVAL (LOW) 25781 (typically 20 minutes face-to-face)  [] EVAL (MOD) 78914 (typically 30 minutes face-to-face)  [] EVAL (HIGH) 85443 (typically 45 minutes face-to-face)  [] RE-EVAL     [x] WZ(59020) x     [] Dry needle 1 or 2 Muscles (87859)  [x] NMR (27911) x     [] Dry needle 3+ Muscles lifting and picking things up from floor. Issued Proper Body Mechanics packet. Cues for exercises and technique  11/30: Patient reports legs being very fatigued today  12/8: Tolerates session \"ok. \" No overall changes with therapy thus far. Continues to have LBP after 15' of standing. Overall Progression Towards Functional goals/ Treatment Progress Update:  [] Patient is progressing as expected towards functional goals listed. [] Progression is slowed due to complexities/Impairments listed. [] Progression has been slowed due to co-morbidities. [x] Plan just implemented, too soon to assess goals progression <30days   [] Goals require adjustment due to lack of progress  [] Patient is not progressing as expected and requires additional follow up with physician  [] Other    Prognosis for POC: [x] Good [] Fair  [] Poor    Patient requires continued skilled intervention: [x] Yes  [] No        PLAN: progress hook lying, sitting, standing strengthening, work hip ROM  [x] Continue per plan of care [] Alter current plan (see comments)  [] Plan of care initiated [] Hold pending MD visit [] Discharge    Electronically signed by: Bigg Ryan, PTA [id number]      Note: If patient does not return for scheduled/recommended follow up visits, this note will serve as a discharge from care along with the most recent update on progress.

## 2022-12-19 ENCOUNTER — HOSPITAL ENCOUNTER (OUTPATIENT)
Dept: PHYSICAL THERAPY | Age: 77
Setting detail: THERAPIES SERIES
Discharge: HOME | End: 2022-12-19
Payer: MEDICARE

## 2022-12-19 PROCEDURE — 97112 NEUROMUSCULAR REEDUCATION: CPT

## 2022-12-19 PROCEDURE — 97110 THERAPEUTIC EXERCISES: CPT

## 2022-12-19 NOTE — FLOWSHEET NOTE
[facility] - Outpatient Rehabilitation and Therapy, [address] [place] [address]  Phone: [phone]   Fax:     [phone]      Physical Therapy Treatment Note/ Progress Report:     Date:  2022    Patient Name:  Kelly Soria    :  1945  MRN: [id number]    Pertinent Medical History:     Referring Provider:   Dr. Alena Fonseca                                    Evaluation Date: 11/10/2022                                               Medical Diagnosis:  Low back pain, unspecified [M54.50]  Other chronic pain [G89.29]     Treatment Diagnosis:      ICD-10-CM     1. Decreased activities of daily living (ADL)  Z78.9                                      Insurance information: [employer]    Plan of care signed (Y/N): Y    Date of Patient follow up with Physician:      Progress Report: []  Yes  [x]  No     Date Range for reporting period:  Beginnin/10/22  Ending:      Progress report due (10 Rx/or 30 days whichever is less):     Recertification due (POC duration/ or 90 days whichever is less):     Visit # POC/Insurance Allowable Auth Needed   8 12 []Yes   [x]No     Latex Allergy:  [x]NO      []YES  Preferred Language for Healthcare:   [x]English       []other:  Functional Scale: FOTO Lumbar = 45;   FOTO 54; 22 47    Pain level:  6-8/10 lumbar    History of Injury: Patient stated she was referred to PT for pain. Pt stated \"since Covid hit I really haven't done anything. \"  Pt has B lumbar pain. Pt denied numbness/tingling. Pt stated she has limits in standing tolerance to 20 minutes when cooking. Pt has trouble walking \"because of the knees. \"  Pt stated sitting is comfortable, lying is comfortable. Pt stated she has bone on bone problems with her knees.     SUBJECTIVE:  See belinda  22 \"My Baker's cyst is just killing me today,\" has not \"noticed it too badly\" regarding back pain; performing HEP once/day  11//17/22 pt stated she started doing more exercises (her HEP) and her L knee is bothering her. Back \"doesn't hurt as long as I'm sitting or laying down\". Pain is greatest at the L anterior thigh  11/28: Initially felt a little better after last session  11/30: Had a horrible night and horrible morning. Did more than she normally does yesterday  12/6: Hasn't been bad. Yesterday was really good. Decorated the [place] trees and did a few other things yesterday. Woke up fine this morning. 12/8: Was walking up the steps and both her knees gave and landed on the next step. Now both knees are hurting. Doesn't feel the back/buttock pain unless she is standing more then 15 minutes. If she sits down, it goes away  12/14: Tired from entertaining, or it could be the weather. Legs are stiff as hell  12/19/22 Pt stated she can stand 20 minutes still when cooking, but the amount of time required to rest and recuperate in \"less than a minute\" but was 15 minutes when she first started PT. \"Sometimes I feel that its hurting\" regarding Baker's cyst, but sometimes she has no pain related to Baker's cyst.  Pt stated she is making improvements in that \"I am walking much better, I'm no longer bending over looking at my feet. .. but that doesn't mean I can walk a block yet. \"  Pt stated her lumbar pain is improving. \"I can walk all through the house now, I just have a fear that this is something I'll have to live with. \"     OBJECTIVE:  Observation:   11/10/22  Posture: flexed, forward shoulders, decreased lumbar lordosis  Functional Mobility/Transfers: I, guarded rolling (fear of falling off of treatment plinth)   Palpation: TTP B lumbar paraspinals  Gait: (include devices/WB status) slow and guarded, decreased B step length and trunk rotation    12/19/22   Posture: flexed, forward shoulders, decreased lumbar lordosis  Functional Mobility/Transfers: I, no guarding with rolling  Palpation: no tenderness of the lumbar region or the L buttock/ITB regions  Gait: (include devices/WB status) slow and guarded, decreased B step length and trunk rotation    Test measurements:    ROM LEFT  11/10/22 RIGHT  11/10/22 L  12/19/22 R  12/19/22   Hip IR, EXT limited limited NT NT   Trunk flexion [employer]   WFL    Trunk Ext Decreased 75%, no pain       Strength  LEFT RIGHT L R   HIP Flexors 5/5 5/5 NT NT   Hip Adductors 4/5 4/5 4/5 4/5   HIP Abductors 4+/5 4+/5 5/5 4+/5   HIP Ext 3+/5 3+/5 NT NT   Hip ER             RESTRICTIONS/PRECAUTIONS: DM, B shoulder surgery, R knee surgery    Exercises/Interventions:     Therapeutic Ex (20521)   Min: 30 Reps/Resistance Notes/CUES   Nu Step 5', seat 6, arm 8    Stretch gastroc incline 3x30\"    Stretch hamstring  Sitting   2x30\" L/R Hip L AAROM   Flexion, IR/ER, ABD 10x each L/R    L clam shell 10x, 3\" hold    Standing hip T-slide  EXT  Abd   Yellow 2X10 R/L  Yellow 2X10 R/L C/o L knee pain during R t-slide exercises   T-band fall out Red 20x    Bridge 10x         Manual Intervention (43631)  Min: 15      STM L buttock/ant thigh IASTM                        NMR re-education (37018)  Min: 15  CUES NEEDED   T-slide  Rows  lats  Sitting trunk flexion   Green, 2x10  Green, 2x10  Green, 2x10    Hook lying  March  LTR stretch     HEP  HEP   Piriformis stretch 1x30\" L/R    Therapeutic Activity (72820)  Min:               Modalities  Min:     IFC with      CP after exercises     MH after exercises            Other Therapeutic Activities: Pt was educated on PT POC, Diagnosis, Prognosis, pathomechanics as well as frequency and duration of scheduling future physical therapy appointments. Time was also taken on this day to answer all patient questions and participation in PT. Reviewed appointment policy in detail with patient and patient verbalized understanding. Home Exercise Program: Patient was instructed in the following for HEP:       Access Code: [email or web address]  URL: [email or web address]  Date: 11/10/2022  Prepared by: Carri Wolfe     Exercises  Hooklying Single Knee to Chest Stretch - 1 x daily - 7 x weekly - 1 reps - 30 hold  Supine Lower Trunk Rotation - 1 x daily - 7 x weekly - 10 reps  Bent Knee Fallouts - 1 x daily - 7 x weekly - 10 reps  Supine Hip Adduction Isometric with Ball - 1 x daily - 7 x weekly - 10 reps - 5 hold  Hooklying Single Leg March - 1 x daily - 7 x weekly - 10 reps    Therapeutic Exercise and NMR EXR  [] (80637) Provided verbal/tactile cueing for activities related to strengthening, flexibility, endurance, ROM for improvements in LE, proximal hip, and core control with self care, mobility, lifting, ambulation.  [] (41955) Provided verbal/tactile cueing for activities related to improving balance, coordination, kinesthetic sense, posture, motor skill, proprioception  to assist with LE, proximal hip, and core control in self care, mobility, lifting, ambulation and eccentric single leg control.      NMR and Therapeutic Activities:    [] (93213 or 00756) Provided verbal/tactile cueing for activities related to improving balance, coordination, kinesthetic sense, posture, motor skill, proprioception and motor activation to allow for proper function of core, proximal hip and LE with self care and ADLs and functional mobility.   [] (26952) Gait Re-education- Provided training and instruction to the patient for proper LE, core and proximal hip recruitment and positioning and eccentric body weight control with ambulation re-education including up and down stairs     Home Exercise Program:    [x] (76598) Reviewed/Progressed HEP activities related to strengthening, flexibility, endurance, ROM of core, proximal hip and LE for functional self-care, mobility, lifting and ambulation/stair navigation   [] (00885)Reviewed/Progressed HEP activities related to improving balance, coordination, kinesthetic sense, posture, motor skill, proprioception of core, proximal hip and LE for self care, mobility, lifting, and ambulation/stair navigation      Manual Treatments:  PROM / STM / Oscillations-Mobs:  G-I, II, III, IV (PA's, Inf., Post.)  [] (50278) Provided manual therapy to mobilize LE, proximal hip and/or LS spine soft tissue/joints for the purpose of modulating pain, promoting relaxation,  increasing ROM, reducing/eliminating soft tissue swelling/inflammation/restriction, improving soft tissue extensibility and allowing for proper ROM for normal function with self care, mobility, lifting and ambulation. Charges:  Timed Code Treatment Minutes: 45   Total Treatment Minutes: 45      [] EVAL (LOW) 06154 (typically 20 minutes face-to-face)  [] EVAL (MOD) 96866 (typically 30 minutes face-to-face)  [] EVAL (HIGH) 94226 (typically 45 minutes face-to-face)  [] RE-EVAL     [x] VY(16267) x 2    [] Dry needle 1 or 2 Muscles (31543)  [] NMR (55462) x     [] Dry needle 3+ Muscles (15072)  [x] Manual (93914) x     [] Ultrasound (73848) x  [] TA (47905) x     [] Mech Traction (64591)  [] ES(attended) (15371)     [] ES (un) (35856):   [] Vasopump (57972) [] Ionto (19239)   [] Other:    GOALS:  Patient stated goal: \"walk more than room to room, love to go in to a store\"; pt likes to dance  [] Progressing: [] Met: [] Not Met: [] Adjusted     Therapist goals for Patient:   Short Term Goals: To be achieved in: 2 weeks  1. Independent in HEP and progression per patient tolerance, in order to prevent re-injury. [] Progressing: [] Met: [] Not Met: [] Adjusted  2. Patient will have a decrease in pain to facilitate improvement in movement, function, and ADLs as indicated by Functional Deficits. [] Progressing: [] Met: [] Not Met: [] Adjusted     Long Term Goals: To be achieved in: 6 weeks  1. FOTO Lumbar Spine will score 52 to assist with reaching prior level of function. [] Progressing: [] Met: [] Not Met: [] Adjusted  2.  Patient will demonstrate increased trunk AROM to full flexion to allow for proper joint functioning as indicated by patients Functional Deficits. [] Progressing: [] Met: [] Not Met: [] Adjusted  3. Patient will demonstrate an increase in Strength to good proximal hip strength and control, within 5lb HHD in LE to allow for proper functional mobility as indicated by patients Functional Deficits. [] Progressing: [] Met: [] Not Met: [] Adjusted  4. Patient will return to washing dishes for 20 mintues without increased symptoms or restriction. [] Progressing: [] Met: [] Not Met: [] Adjusted  5. Patient will be able to grocery shop for 20 minutes without aggravating pain. [] Progressing: [] Met: [] Not Met: [] Adjusted         ASSESSMENT:  See eval    Treatment/Activity Tolerance:  [x] Patient tolerated treatment well [] Patient limited by fatique  [] Patient limited by pain  [] Patient limited by other medical complications  [] Other:   11/28: Reviewed body mechanics for lifting and picking things up from floor. Issued Proper Body Mechanics packet. Cues for exercises and technique  11/30: Patient reports legs being very fatigued today  12/8: Tolerates session \"ok. \" No overall changes with therapy thus far. Continues to have LBP after 15' of standing. Overall Progression Towards Functional goals/ Treatment Progress Update:  [] Patient is progressing as expected towards functional goals listed. [] Progression is slowed due to complexities/Impairments listed. [] Progression has been slowed due to co-morbidities.   [x] Plan just implemented, too soon to assess goals progression <30days   [] Goals require adjustment due to lack of progress  [] Patient is not progressing as expected and requires additional follow up with physician  [] Other    Prognosis for POC: [x] Good [] Fair  [] Poor    Patient requires continued skilled intervention: [x] Yes  [] No        PLAN: progress hook lying, sitting, standing strengthening, work hip ROM  [x] Continue per plan of care [] Alter current plan (see comments)  [] Plan of care initiated [] Hold pending MD visit [] Discharge    Electronically signed by: Jacobo Adams, PT [id number]      Note: If patient does not return for scheduled/recommended follow up visits, this note will serve as a discharge from care along with the most recent update on progress.

## 2022-12-27 ENCOUNTER — HOSPITAL ENCOUNTER (OUTPATIENT)
Dept: PHYSICAL THERAPY | Age: 77
Setting detail: THERAPIES SERIES
Discharge: HOME | End: 2022-12-27
Payer: MEDICARE

## 2022-12-27 NOTE — FLOWSHEET NOTE
[facility] [address]  [place] [address]  Phone: [phone]   Fax:     [phone]     Physical Therapy  Cancellation/No-show Note  Patient Name:  Lindsay Vigil  :  1945   Date:  2022  Cancelled visits to date: 1  No-shows to date: 0    Patient status for today's appointment patient:  [x]  Cancelled  []  Rescheduled appointment  []  No-show     Reason given by patient:  []  Patient ill  []  Conflicting appointment  []  No transportation    []  Conflict with work  []  No reason given  []  Other:     Comments:  out of town    Phone call information:   []  Phone call made today to patient at _ time at number provided:      []  Patient answered, conversation as follows:    []  Patient did not answer, message left as follows:  []  Phone call not made today    Electronically signed by:  Tuyet Rojas, PT

## 2022-12-29 ENCOUNTER — HOSPITAL ENCOUNTER (OUTPATIENT)
Dept: PHYSICAL THERAPY | Age: 77
Setting detail: THERAPIES SERIES
Discharge: HOME | End: 2022-12-29
Payer: MEDICARE

## 2022-12-29 PROCEDURE — 97112 NEUROMUSCULAR REEDUCATION: CPT

## 2022-12-29 PROCEDURE — 97110 THERAPEUTIC EXERCISES: CPT

## 2022-12-29 PROCEDURE — 97140 MANUAL THERAPY 1/> REGIONS: CPT

## 2022-12-29 NOTE — FLOWSHEET NOTE
[facility], [address] [place] [address]  Phone: [phone]   Fax:     [phone]      Physical Therapy Treatment Note/ Progress Report:     Date:  2022    Patient Name:  Cammy Payne    :  1945  MRN: [id number]    Pertinent Medical History:     Referring Provider:   Dr. Rachelle Carter                                    Evaluation Date: 11/10/2022                                               Medical Diagnosis:  Low back pain, unspecified [M54.50]  Other chronic pain [G89.29]     Treatment Diagnosis:      ICD-10-CM     1. Decreased activities of daily living (ADL)  Z78.9                                      Insurance information: [employer]    Plan of care signed (Y/N): Y    Date of Patient follow up with Physician:      Progress Report: []  Yes  [x]  No     Date Range for reporting period:  Beginnin/10/22  Ending:      Progress report due (10 Rx/or 30 days whichever is less):     Recertification due (POC duration/ or 90 days whichever is less):     Visit # POC/Insurance Allowable Auth Needed   9 12 []Yes   [x]No     Latex Allergy:  [x]NO      []YES  Preferred Language for Healthcare:   [x]English       []other:  Functional Scale: FOTO Lumbar = 45;   FOTO 54; 22 47; 22 41    Pain level:  6-8/10 lumbar    History of Injury: Patient stated she was referred to PT for pain. Pt stated \"since Covid hit I really haven't done anything. \"  Pt has B lumbar pain. Pt denied numbness/tingling. Pt stated she has limits in standing tolerance to 20 minutes when cooking. Pt has trouble walking \"because of the knees. \"  Pt stated sitting is comfortable, lying is comfortable. Pt stated she has bone on bone problems with her knees.     SUBJECTIVE:  See eval  22 \"My Baker's cyst is just killing me today,\" has not \"noticed it too badly\" regarding back pain; performing HEP once/day  11//17/22 pt stated she started doing more exercises (her HEP) and her L knee is bothering her. Back \"doesn't hurt as long as I'm sitting or laying down\". Pain is greatest at the L anterior thigh  11/28: Initially felt a little better after last session  11/30: Had a horrible night and horrible morning. Did more than she normally does yesterday  12/6: Hasn't been bad. Yesterday was really good. Decorated the [place] trees and did a few other things yesterday. Woke up fine this morning. 12/8: Was walking up the steps and both her knees gave and landed on the next step. Now both knees are hurting. Doesn't feel the back/buttock pain unless she is standing more then 15 minutes. If she sits down, it goes away  12/14: Tired from entertaining, or it could be the weather. Legs are stiff as hell  12/19/22 Pt stated she can stand 20 minutes still when cooking, but the amount of time required to rest and recuperate in \"less than a minute\" but was 15 minutes when she first started PT. \"Sometimes I feel that its hurting\" regarding Baker's cyst, but sometimes she has no pain related to Baker's cyst.  Pt stated she is making improvements in that \"I am walking much better, I'm no longer bending over looking at my feet. .. but that doesn't mean I can walk a block yet. \"  Pt stated her lumbar pain is improving. \"I can walk all through the house now, I just have a fear that this is something I'll have to live with. \"   12/29/22 Pt stated \"I've been miserable all week. .. the cold weather, the knees, and the back only hurts if I stand up too long. \"  Pt stated she slacked with her HEP this week as she was out of town.     OBJECTIVE:  Observation:   11/10/22  Posture: flexed, forward shoulders, decreased lumbar lordosis  Functional Mobility/Transfers: I, guarded rolling (fear of falling off of treatment plinth)   Palpation: TTP B lumbar paraspinals  Gait: (include devices/WB status) slow and guarded, decreased B step length and trunk rotation    12/19/22   Posture: flexed, forward shoulders, decreased lumbar lordosis  Functional Mobility/Transfers: I, no guarding with rolling  Palpation: no tenderness of the lumbar region or the L buttock/ITB regions  Gait: (include devices/WB status) slow and guarded, decreased B step length and trunk rotation    Test measurements:    ROM LEFT  11/10/22 RIGHT  11/10/22 L  12/19/22 R  12/19/22   Hip IR, EXT limited limited NT NT   Trunk flexion [employer]   WFL    Trunk Ext Decreased 75%, no pain       Strength  LEFT RIGHT L R   HIP Flexors 5/5 5/5 NT NT   Hip Adductors 4/5 4/5 4/5 4/5   HIP Abductors 4+/5 4+/5 5/5 4+/5   HIP Ext 3+/5 3+/5 NT NT   Hip ER             RESTRICTIONS/PRECAUTIONS: DM, B shoulder surgery, R knee surgery    Exercises/Interventions:     Therapeutic Ex (52579)   Min: 20 Reps/Resistance Notes/CUES   Nu Step 7', seat 6, arm 8    Stretch gastroc incline 3x30\"    Stretch hamstring  Sitting   2x30\" L/R Hip L AAROM   Flexion, IR/ER, ABD    L clam shell 10x, 3\" hold    Standing hip T-slide  EXT  Abd   Yellow 2X10 R/L  Yellow 2X10 R/L C/o L knee pain during R t-slide exercises   T-band fall out    Bridge 10x         Manual Intervention (72535)  Min: 15      STM L lumbar lower paraspinals 15'                        NMR re-education (76373)  Min: 10  CUES NEEDED   T-slide  Rows  lats  Sitting trunk flexion   Green, 2x10  Green, 2x10  Green, 2x10    Hook lying  March  LTR stretch     HEP  HEP   Piriformis stretch    Therapeutic Activity (05422)  Min:               Modalities  Min:     IFC with      CP after exercises     MH after exercises            Other Therapeutic Activities: Pt was educated on PT POC, Diagnosis, Prognosis, pathomechanics as well as frequency and duration of scheduling future physical therapy appointments. Time was also taken on this day to answer all patient questions and participation in PT.  Reviewed appointment policy in detail with patient and patient verbalized understanding. Home Exercise Program: Patient was instructed in the following for HEP:       Access Code: [email or web address]  URL: [email or web address]  Date: 12/29/2022- revised  Prepared by: Gatha Plate    Exercises  Hooklying Single Knee to Chest Stretch - 1 x daily - 7 x weekly - 1 reps - 30 hold  Supine Lower Trunk Rotation - 1 x daily - 7 x weekly - 10 reps  Bent Knee Fallouts - 1 x daily - 7 x weekly - 10 reps  Supine Hip Adduction Isometric with Ball - 1 x daily - 7 x weekly - 10 reps - 5 hold  Hooklying Single Leg March - 1 x daily - 7 x weekly - 10 reps  Supine Bridge - 1 x daily - 7 x weekly - 10 reps - 5 hold  Seated Shoulder Row with Anchored Resistance - 1 x daily - 7 x weekly - 2 sets - 10 reps  Seated Lat Pull Down with Resistance - Elbows Bent - 1 x daily - 7 x weekly - 2 sets - 10 reps      Therapeutic Exercise and NMR EXR  [] (84282) Provided verbal/tactile cueing for activities related to strengthening, flexibility, endurance, ROM for improvements in LE, proximal hip, and core control with self care, mobility, lifting, ambulation.  [] (52095) Provided verbal/tactile cueing for activities related to improving balance, coordination, kinesthetic sense, posture, motor skill, proprioception  to assist with LE, proximal hip, and core control in self care, mobility, lifting, ambulation and eccentric single leg control.      NMR and Therapeutic Activities:    [] (26379 or 79292) Provided verbal/tactile cueing for activities related to improving balance, coordination, kinesthetic sense, posture, motor skill, proprioception and motor activation to allow for proper function of core, proximal hip and LE with self care and ADLs and functional mobility.   [] (10127) Gait Re-education- Provided training and instruction to the patient for proper LE, core and proximal hip recruitment and positioning and eccentric body weight control with ambulation re-education including up and down stairs     Home Exercise Program:    [x] (74106) Reviewed/Progressed HEP activities related to strengthening, flexibility, endurance, ROM of core, proximal hip and LE for functional self-care, mobility, lifting and ambulation/stair navigation   [] (34508)Reviewed/Progressed HEP activities related to improving balance, coordination, kinesthetic sense, posture, motor skill, proprioception of core, proximal hip and LE for self care, mobility, lifting, and ambulation/stair navigation      Manual Treatments:  PROM / STM / Oscillations-Mobs:  G-I, II, III, IV (PA's, Inf., Post.)  [] (15578) Provided manual therapy to mobilize LE, proximal hip and/or LS spine soft tissue/joints for the purpose of modulating pain, promoting relaxation,  increasing ROM, reducing/eliminating soft tissue swelling/inflammation/restriction, improving soft tissue extensibility and allowing for proper ROM for normal function with self care, mobility, lifting and ambulation. Charges:  Timed Code Treatment Minutes: 45   Total Treatment Minutes: 45      [] EVAL (LOW) 33085 (typically 20 minutes face-to-face)  [] EVAL (MOD) 84375 (typically 30 minutes face-to-face)  [] EVAL (HIGH) 31218 (typically 45 minutes face-to-face)  [] RE-EVAL     [x] AF(16953) x     [] Dry needle 1 or 2 Muscles (52058)  [x] NMR (34734) x     [] Dry needle 3+ Muscles (09005)  [x] Manual (45260) x     [] Ultrasound (08829) x  [] TA (42426) x     [] Mech Traction (05734)  [] ES(attended) (20657)     [] ES (un) (73393):   [] Vasopump (41475) [] Ionto (71294)   [] Other:    GOALS:  Patient stated goal: \"walk more than room to room, love to go in to a store\"; pt likes to dance  [] Progressing: [] Met: [] Not Met: [] Adjusted     Therapist goals for Patient:   Short Term Goals: To be achieved in: 2 weeks  1. Independent in HEP and progression per patient tolerance, in order to prevent re-injury. [] Progressing: [] Met: [] Not Met: [] Adjusted  2.  Patient will have a decrease in pain to facilitate improvement in movement, function, and ADLs as indicated by Functional Deficits. [] Progressing: [] Met: [] Not Met: [] Adjusted     Long Term Goals: To be achieved in: 6 weeks  1. FOTO Lumbar Spine will score 52 to assist with reaching prior level of function. [] Progressing: [] Met: [] Not Met: [] Adjusted  2. Patient will demonstrate increased trunk AROM to full flexion to allow for proper joint functioning as indicated by patients Functional Deficits. [] Progressing: [] Met: [] Not Met: [] Adjusted  3. Patient will demonstrate an increase in Strength to good proximal hip strength and control, within 5lb HHD in LE to allow for proper functional mobility as indicated by patients Functional Deficits. [] Progressing: [] Met: [] Not Met: [] Adjusted  4. Patient will return to washing dishes for 20 mintues without increased symptoms or restriction. [] Progressing: [] Met: [] Not Met: [] Adjusted  5. Patient will be able to grocery shop for 20 minutes without aggravating pain. [] Progressing: [] Met: [] Not Met: [] Adjusted         ASSESSMENT:  See eval    Treatment/Activity Tolerance:  [x] Patient tolerated treatment well [] Patient limited by fatique  [] Patient limited by pain  [] Patient limited by other medical complications  [] Other:   11/28: Reviewed body mechanics for lifting and picking things up from floor. Issued [employer] packet. Cues for exercises and technique  11/30: Patient reports legs being very fatigued today  12/8: Tolerates session \"ok. \" No overall changes with therapy thus far. Continues to have LBP after 15' of standing. Overall Progression Towards Functional goals/ Treatment Progress Update:  [] Patient is progressing as expected towards functional goals listed. [] Progression is slowed due to complexities/Impairments listed. [] Progression has been slowed due to co-morbidities.   [x] Plan just implemented, too soon to assess goals progression <30days   [] Goals require adjustment due to lack of progress  [] Patient is not progressing as expected and requires additional follow up with physician  [] Other    Prognosis for POC: [x] Good [] Fair  [] Poor    Patient requires continued skilled intervention: [x] Yes  [] No        PLAN: progress hook lying, sitting, standing strengthening, work hip ROM  [x] Continue per plan of care [] Alter current plan (see comments)  [] Plan of care initiated [] Hold pending MD visit [] Discharge    Electronically signed by: Nancy Mendoza, PT [id number]      Note: If patient does not return for scheduled/recommended follow up visits, this note will serve as a discharge from care along with the most recent update on progress.

## 2023-01-04 ENCOUNTER — HOSPITAL ENCOUNTER (OUTPATIENT)
Dept: PHYSICAL THERAPY | Age: 78
Setting detail: THERAPIES SERIES
Discharge: HOME | End: 2023-01-04

## 2023-01-04 NOTE — FLOWSHEET NOTE
[place] [facility] [address]  [place] [address]  Phone: [phone]   Fax:     [phone]     Physical Therapy  Cancellation/No-show Note  Patient Name:  Irma Infante  :  1945   Date:  2023  Cancelled visits to date: 2  No-shows to date: 0    Patient status for today's appointment patient:  [x]  Cancelled  []  Rescheduled appointment  []  No-show     Reason given by patient:  [x]  Patient ill  []  Conflicting appointment  []  No transportation    []  Conflict with work  []  No reason given  []  Other:     Comments:     Phone call information:   []  Phone call made today to patient at _ time at number provided:      []  Patient answered, conversation as follows:    []  Patient did not answer, message left as follows:  []  Phone call not made today    Electronically signed by:  Nicole Glaser, PT